# Patient Record
Sex: MALE | Race: ASIAN | URBAN - METROPOLITAN AREA
[De-identification: names, ages, dates, MRNs, and addresses within clinical notes are randomized per-mention and may not be internally consistent; named-entity substitution may affect disease eponyms.]

---

## 2017-10-20 ENCOUNTER — OFFICE VISIT (OUTPATIENT)
Dept: INTERNAL MEDICINE CLINIC | Age: 47
End: 2017-10-20

## 2017-10-20 VITALS
HEIGHT: 64 IN | RESPIRATION RATE: 18 BRPM | WEIGHT: 132.4 LBS | SYSTOLIC BLOOD PRESSURE: 120 MMHG | BODY MASS INDEX: 22.61 KG/M2 | TEMPERATURE: 97.4 F | HEART RATE: 78 BPM | OXYGEN SATURATION: 98 % | DIASTOLIC BLOOD PRESSURE: 76 MMHG

## 2017-10-20 DIAGNOSIS — Z00.00 ROUTINE GENERAL MEDICAL EXAMINATION AT A HEALTH CARE FACILITY: Primary | ICD-10-CM

## 2017-10-20 DIAGNOSIS — D17.9 LIPOMA, UNSPECIFIED SITE: ICD-10-CM

## 2017-10-20 DIAGNOSIS — Z23 ENCOUNTER FOR IMMUNIZATION: ICD-10-CM

## 2017-10-20 DIAGNOSIS — K51.919 ULCERATIVE COLITIS WITH COMPLICATION, UNSPECIFIED LOCATION (HCC): ICD-10-CM

## 2017-10-20 RX ORDER — INFLIXIMAB 100 MG/10ML
5 INJECTION, POWDER, LYOPHILIZED, FOR SOLUTION INTRAVENOUS ONCE
COMMUNITY
End: 2019-02-19

## 2017-10-20 RX ORDER — GLUCOSAMINE SULFATE 1500 MG
POWDER IN PACKET (EA) ORAL DAILY
COMMUNITY
End: 2019-03-13

## 2017-10-20 NOTE — PROGRESS NOTES
Add dyazie   Bp check 4 weeks   Chief Complaint   Patient presents with   [facility]    Pain (Chronic)     Pt states having pain in right elbow for 2 weeks         1. Have you been to the ER, urgent care clinic since your last visit? No Hospitalized since your last visit? No    2. Have you seen or consulted any other health care providers outside of the [address] [place] [address] since your last visit? No  Include any pap smears or colon screening. No     [place] Bounds who present for routine immunizations. He denies any symptoms , reactions or allergies that would exclude them from being immunized today. Risks and adverse reactions were discussed and the VIS was given to them. All questions were addressed. He was observed for 5 min post injection. There were no reactions observed.     Rosario Terrell

## 2017-10-20 NOTE — PROGRESS NOTES
Establish Care and Pain (Chronic) (Pt states having pain in right elbow for 2 weeks )       HPI:  Jocelyn Benites is a 49y.o. year old male who is here to establish care. He  had his medical care:    Danay Nicholson (wife)  PCP in [place]    He reports the following history and medical concerns:      Ulcerative Colitis- First Visit today- Dr. Deedee Helm. Still getting remicaide. Getting every 8 weeks. No symptoms. No complications over 10 years. Takes vit D. Last blood over a year ago. New Problem:     normally go to the gym. 2 weeks ago. [place]. Firey pain on left tendon        Assessment and Plan        1. Encounter for immunization  Immunization given. Discussed risks and benefits. Side effects. VIS given through visit summary via Mychart or paper copy if not on Mychart   - inFLIXimab (REMICADE) 100 mg injection; 5 mg/kg by IntraVENous route once. - cholecalciferol (VITAMIN D3) 1,000 unit cap; Take  by mouth daily.  - Influenza virus vaccine (QUADRIVALENT PRES FREE SYRINGE) IM (75942)  - IA IMMUNIZ ADMIN,1 SINGLE/COMB VAC/TOXOID    2. Routine general medical examination at a health care facility  Well exam.  Will see GI for follow up.    - inFLIXimab (REMICADE) 100 mg injection; 5 mg/kg by IntraVENous route once. - cholecalciferol (VITAMIN D3) 1,000 unit cap; Take  by mouth daily.  - Influenza virus vaccine (QUADRIVALENT PRES FREE SYRINGE) IM ([id number])  - IA IMMUNIZ ADMIN,1 SINGLE/COMB VAC/TOXOID  - LIPID PANEL  - TSH REFLEX TO T4  - METABOLIC PANEL, COMPREHENSIVE  - HEMOGLOBIN A1C WITH EAG  - VITAMIN D, 25 HYDROXY  - PSA SCREENING (SCREENING)  - CBC WITH AUTOMATED DIFF  - UA/M W/RFLX CULTURE, ROUTINE  - MICROALBUMIN, UR, RAND W/ MICROALBUMIN/CREA RATIO  - C REACTIVE PROTEIN, QT  - QUANTIFERON TB GOLD    3. Ulcerative colitis with complication, unspecified location [facility])  Pt will see Dr. Deedee Helm.   Labs ordered for CRP and quantiferon.  - inFLIXimab (REMICADE) 100 mg injection; 5 mg/kg by IntraVENous route once. - cholecalciferol (VITAMIN D3) 1,000 unit cap; Take  by mouth daily.  - Influenza virus vaccine (QUADRIVALENT PRES FREE SYRINGE) IM ([id number])  - MT IMMUNIZ ADMIN,1 SINGLE/COMB VAC/TOXOID  - LIPID PANEL  - TSH REFLEX TO T4  - METABOLIC PANEL, COMPREHENSIVE  - HEMOGLOBIN A1C WITH EAG  - VITAMIN D, 25 HYDROXY  - PSA SCREENING (SCREENING)  - CBC WITH AUTOMATED DIFF  - UA/M W/RFLX CULTURE, ROUTINE  - MICROALBUMIN, UR, RAND W/ MICROALBUMIN/CREA RATIO  - C REACTIVE PROTEIN, QT  - QUANTIFERON TB GOLD  - PSA SCREENING (SCREENING)    4. Lipoma, unspecified site  Lipoma like lesions, some increasing in size. Wants evaluation  - REFERRAL TO DERMATOLOGY    5. Golfer's elbow from weight lifting (doing fly's)  Rest, tiger balm, strengthening exercises. Visit Vitals    /76 (BP 1 Location: Left arm, BP Patient Position: Sitting)    Pulse 78    Temp 97.4 °F (36.3 °C) (Oral)    Resp 18    Ht 5' 4.4\" (1.636 m)    Wt 132 lb 6.4 oz (60.1 kg)    SpO2 98%    BMI 22.44 kg/m2       Historical Data    Past Medical History:   Diagnosis Date    Chronic pain     joint pain right elbow        Past Surgical History:   Procedure Laterality Date    HX TUMOR REMOVAL  2013    lipomas removed from different areas        Outpatient Encounter Prescriptions as of 10/20/2017   Medication Sig Dispense Refill    inFLIXimab (REMICADE) 100 mg injection 5 mg/kg by IntraVENous route once.  cholecalciferol (VITAMIN D3) 1,000 unit cap Take  by mouth daily. No facility-administered encounter medications on file as of 10/20/2017.          No Known Allergies     Social History     Social History    Marital status:      Spouse name: N/A    Number of children: N/A    Years of education: N/A     Occupational History    [employer] video      Social History Main Topics    Smoking status: Never Smoker    Smokeless tobacco: Never Used    Alcohol use Yes      Comment: 4 per week    Drug use: No    Sexual activity: Yes     Partners: Female     Other Topics Concern    Not on file     Social History Narrative    No narrative on file        family history includes Hypertension in his mother; Thyroid Disease in his father and sister. Review of Systems   Constitutional: Negative for weight loss. Eyes: Negative for blurred vision. Respiratory: Negative for shortness of breath. Cardiovascular: Negative for chest pain and leg swelling. Gastrointestinal: Negative for abdominal pain. Genitourinary: Negative for dysuria and frequency. Skin: Negative for rash. Neurological: Negative for dizziness, focal weakness, weakness and headaches. Endo/Heme/Allergies: Negative for environmental allergies. Does not bruise/bleed easily. Psychiatric/Behavioral: Negative for depression. Physical Exam   Constitutional: He is oriented to person, place, and time. He appears well-nourished. No distress. HENT:   Right Ear: External ear normal.   Left Ear: External ear normal.   Mouth/Throat: No oropharyngeal exudate. Neck: Carotid bruit is not present. No thyromegaly present. Cardiovascular: Normal rate, regular rhythm and normal heart sounds. Pulmonary/Chest: Effort normal and breath sounds normal. No respiratory distress. He has no wheezes. Abdominal: Soft. Bowel sounds are normal. He exhibits mass (skin lipoma like lesions present all over abdomen). There is no tenderness. Hernia confirmed negative in the right inguinal area and confirmed negative in the left inguinal area. Genitourinary: Penis normal. Right testis shows no mass, no swelling and no tenderness. Left testis shows no mass, no swelling and no tenderness. Musculoskeletal: He exhibits no edema or tenderness. Lymphadenopathy:     He has no cervical adenopathy. No inguinal adenopathy noted on the right or left side. Right: No inguinal adenopathy present. Left: No inguinal adenopathy present.    Neurological: He is alert and oriented to person, place, and time. Skin: Skin is warm and dry. No rash noted. No erythema. Psychiatric: He has a normal mood and affect. Thought content normal.   Nursing note and vitals reviewed. Ortho Exam       Orders Placed This Encounter    RI IMMUNIZ ADMIN,1 SINGLE/COMB VAC/TOXOID    QUANTIFERON TB GOLD    Influenza virus vaccine (QUADRIVALENT PRES FREE SYRINGE) IM ([id number])    LIPID PANEL    TSH REFLEX TO T4    METABOLIC PANEL, COMPREHENSIVE    HEMOGLOBIN A1C WITH EAG    VITAMIN D, 25 HYDROXY    PSA SCREENING (SCREENING)    CBC WITH AUTOMATED DIFF    UA/M W/RFLX CULTURE, ROUTINE    MICROALBUMIN, UR, RAND W/ MICROALBUMIN/CREA RATIO    C REACTIVE PROTEIN, QT    PSA SCREENING (SCREENING)    CBC WITH AUTOMATED DIFF     Standing Status:   Future     Standing Expiration Date:       METABOLIC PANEL, COMPREHENSIVE     Standing Status:   Future     Standing Expiration Date:   2018    LIPID PANEL     Standing Status:   Future     Standing Expiration Date:   2018    TSH 3RD GENERATION     Standing Status:   Future     Standing Expiration Date:   2018    VITAMIN D, 25 HYDROXY     Standing Status:   Future     Standing Expiration Date:   2018    PSA TOTAL (REFLEX TO FREE)     Standing Status:   Future     Standing Expiration Date:   2018    HEMOGLOBIN A1C WITH EAG     Standing Status:   Future     Standing Expiration Date:   2018    URINALYSIS W/ RFLX MICROSCOPIC     Standing Status:   Future     Standing Expiration Date:   2018    MICROALBUMIN, UR, RAND W/ MICROALBUMIN/CREA RATIO     Standing Status:   Future     Standing Expiration Date:   2018    REFERRAL TO DERMATOLOGY     Referral Priority:   Routine     Referral Type:   Consultation     Referral Reason:   Specialty Services Required     Referred to Provider:   Wilmer Nevarez MD    inFLIXimab (REMICADE) 100 mg injection     Si mg/kg by IntraVENous route once.  cholecalciferol (VITAMIN D3) 1,000 unit cap     Sig: Take  by mouth daily. I have reviewed the patient's medical history in detail and updated the computerized patient record. We had a prolonged discussion about these complex clinical issues and went over the various important aspects to consider. All questions were answered. Advised him to call back or return to office if symptoms do not improve, change in nature, or persist.    He was given an after visit summary or informed of [employer] which includes patient instructions, diagnoses, current medications, & vitals. He expressed understanding with the diagnosis and plan.

## 2017-10-20 NOTE — PATIENT INSTRUCTIONS
Vaccine Information Statement    Influenza (Flu) Vaccine (Inactivated or Recombinant): What you need to know    Many Vaccine Information Statements are available in Romanian and other languages. See www.immunize.org/vis  Hojas de Información Sobre Vacunas están disponibles en Español y en muchos otros idiomas. Visite www.immunize.org/vis    1. Why get vaccinated? Influenza (flu) is a contagious disease that spreads around the [place] every year, usually between October and May. Flu is caused by influenza viruses, and is spread mainly by coughing, sneezing, and close contact. Anyone can get flu. Flu strikes suddenly and can last several days. Symptoms vary by age, but can include:   fever/chills   sore throat   muscle aches   fatigue   cough   headache    runny or stuffy nose    Flu can also lead to pneumonia and blood infections, and cause diarrhea and seizures in children. If you have a medical condition, such as heart or lung disease, flu can make it worse. Flu is more dangerous for some people. Infants and young children, people 72years of age and older, pregnant women, and people with certain health conditions or a weakened immune system are at greatest risk. Each year thousands of people in the [facility] die from flu, and many more are hospitalized. Flu vaccine can:   keep you from getting flu,   make flu less severe if you do get it, and   keep you from spreading flu to your family and other people. 2. Inactivated and recombinant flu vaccines    A dose of flu vaccine is recommended every flu season. Children 6 months through 6years of age may need two doses during the same flu season. Everyone else needs only one dose each flu season.        Some inactivated flu vaccines contain a very small amount of a mercury-based preservative called thimerosal. Studies have not shown thimerosal in vaccines to be harmful, but flu vaccines that do not contain thimerosal are available. There is no live flu virus in flu shots. They cannot cause the flu. There are many flu viruses, and they are always changing. Each year a new flu vaccine is made to protect against three or four viruses that are likely to cause disease in the upcoming flu season. But even when the vaccine doesnt exactly match these viruses, it may still provide some protection    Flu vaccine cannot prevent:   flu that is caused by a virus not covered by the vaccine, or   illnesses that look like flu but are not. It takes about 2 weeks for protection to develop after vaccination, and protection lasts through the flu season. 3. Some people should not get this vaccine    Tell the person who is giving you the vaccine:     If you have any severe, life-threatening allergies. If you ever had a life-threatening allergic reaction after a dose of flu vaccine, or have a severe allergy to any part of this vaccine, you may be advised not to get vaccinated. Most, but not all, types of flu vaccine contain a small amount of egg protein.  If you ever had Guillain-Barré Syndrome (also called GBS). Some people with a history of GBS should not get this vaccine. This should be discussed with your doctor.  If you are not feeling well. It is usually okay to get flu vaccine when you have a mild illness, but you might be asked to come back when you feel better. 4. Risks of a vaccine reaction    With any medicine, including vaccines, there is a chance of reactions. These are usually mild and go away on their own, but serious reactions are also possible. Most people who get a flu shot do not have any problems with it.      Minor problems following a flu shot include:    soreness, redness, or swelling where the shot was given     hoarseness   sore, red or itchy eyes   cough   fever   aches   headache   itching   fatigue  If these problems occur, they usually begin soon after the shot and last 1 or 2 days. More serious problems following a flu shot can include the following:     There may be a small increased risk of Guillain-Barré Syndrome (GBS) after inactivated flu vaccine. This risk has been estimated at 1 or 2 additional cases per million people vaccinated. This is much lower than the risk of severe complications from flu, which can be prevented by flu vaccine.  Young children who get the flu shot along with pneumococcal vaccine (PCV13) and/or DTaP vaccine at the same time might be slightly more likely to have a seizure caused by fever. Ask your doctor for more information. Tell your doctor if a child who is getting flu vaccine has ever had a seizure. Problems that could happen after any injected vaccine:      People sometimes faint after a medical procedure, including vaccination. Sitting or lying down for about 15 minutes can help prevent fainting, and injuries caused by a fall. Tell your doctor if you feel dizzy, or have vision changes or ringing in the ears.  Some people get severe pain in the shoulder and have difficulty moving the arm where a shot was given. This happens very rarely.  Any medication can cause a severe allergic reaction. Such reactions from a vaccine are very rare, estimated at about 1 in a million doses, and would happen within a few minutes to a few hours after the vaccination. As with any medicine, there is a very remote chance of a vaccine causing a serious injury or death. The safety of vaccines is always being monitored. For more information, visit: www.cdc.gov/vaccinesafety/    5. What if there is a serious reaction? What should I look for?  Look for anything that concerns you, such as signs of a severe allergic reaction, very high fever, or unusual behavior.     Signs of a severe allergic reaction can include hives, swelling of the face and throat, difficulty breathing, a fast heartbeat, dizziness, and weakness  usually within a few minutes to a few hours after the vaccination. What should I do?  If you think it is a severe allergic reaction or other emergency that cant wait, call 9-1-1 and get the person to the nearest hospital. Otherwise, call your doctor.  Reactions should be reported to the Vaccine Adverse Event Reporting System (VAERS). Your doctor should file this report, or you can do it yourself through  the VAERS web site at www.vaers. [facility].gov, or by calling 2-692.621.6949. VAERS does not give medical advice. 6. The National Vaccine Injury Compensation Program    The [facility] Vaccine Injury Compensation Program (VICP) is a federal program that was created to compensate people who may have been injured by certain vaccines. Persons who believe they may have been injured by a vaccine can learn about the program and about filing a claim by calling 8-864.925.5961 or visiting the [employer] website at www.[facility].gov/vaccinecompensation. There is a time limit to file a claim for compensation. 7. How can I learn more?  Ask your healthcare provider. He or she can give you the vaccine package insert or suggest other sources of information.  Call your local or state health department.  Contact the Centers for Disease Control and Prevention (CDC):  - Call 9-280.793.4108 (1-800-CDC-INFO) or  - Visit CDCs website at www.cdc.gov/flu    Vaccine Information Statement   Inactivated Influenza Vaccine   8/7/2015  42 URoxann Christofer Byrne [id number]    Department of Health and Human Services  Centers for Disease Control and Prevention    Office Use Only           Golfer's Elbow: Exercises  Your Care Instructions  Here are some examples of typical rehabilitation exercises for your condition. Start each exercise slowly. Ease off the exercise if you start to have pain. Your doctor or physical therapist will tell you when you can start these exercises and which ones will work best for you.   How to do the exercises  Wrist extensor stretch    1. Extend your affected arm in front of you and make a fist with your palm facing down. 2. Bend your wrist so that your fist points toward the floor. 3. With your other hand, gently bend your wrist farther until you feel a mild to moderate stretch in your forearm. 4. Hold for at least 15 to 30 seconds. 5. Repeat 2 to 4 times. 6. Repeat steps 1 through 5 with your fingers pointing toward the floor. Forearm extensor stretch    1. Place your affected elbow down at your side, bent at about 90 degrees. Then make a fist with your palm facing down. 2. Keeping your wrist bent, slowly straighten your elbow so your arm is down at your side. Then twist your fist out so your palm is facing out to the side and you feel a stretch. 3. Hold for at least 15 to 30 seconds. 4. Repeat 2 to 4 times. Wrist flexor stretch    1. Extend your affected arm in front of you with your palm facing away from your body. 2. Bend back your wrist, pointing your hand up toward the ceiling. 3. With your other hand, gently bend your wrist farther until you feel a mild to moderate stretch in your forearm. 4. Hold for at least 15 to 30 seconds. 5. Repeat 2 to 4 times. 6. Repeat steps 1 through 5, but this time extend your affected arm in front of you with your palm facing up. Then bend back your wrist, pointing your hand toward the floor. Wrist curls    1. Place your forearm on a table with your hand hanging over the edge of the table, palm up. 2. Place a 1- to 2-pound weight in your hand. This may be a dumbbell, a can of food, or a filled water bottle. 3. Slowly raise and lower the weight while keeping your forearm on the table and your palm facing up. 4. Repeat this motion 8 to 12 times. 5. Switch arms, and do steps 1 through 4.  6. Repeat with your hand facing down toward the floor. Switch arms. Resisted wrist extension    1. Sit leaning forward with your legs slightly spread.  Then place your affected forearm on your thigh with your hand and wrist in front of your knee. 2. Grasp one end of an exercise band with your palm down, and step on the other end.  3. Slowly bend your wrist upward for a count of 2, then lower your wrist slowly to a count of 5.  4. Repeat 8 to 12 times. Resisted wrist flexion    1. Sit leaning forward with your legs slightly spread. Then place your affected forearm on your thigh with your hand and wrist in front of your knee. 2. Grasp one end of an exercise band with your palm up, and step on the other end.  3. Slowly bend your wrist upward for a count of 2, then lower your wrist slowly to a count of 5.  4. Repeat 8 to 12 times. Neck stretch to the side    1. This stretch works best if you keep your shoulder down as you lean away from it. To help you remember to do this, start by relaxing your shoulders and lightly holding on to your thighs or your chair. 2. Tilt your head away from your affected elbow and toward your opposite shoulder. For example, if your right elbow is sore, keep your right shoulder down as you lean your head toward your left shoulder. 3. Hold for 15 to 30 seconds. Let the weight of your head stretch your muscles. 4. If you would like a little added stretch, use your hand to gently and steadily pull your head toward your shoulder. For example, if your right elbow is sore, use your left hand to gently pull your head toward your left shoulder. 5. Repeat 2 to 4 times. Resisted forearm pronation    1. Sit leaning forward with your legs slightly spread. Then place your affected forearm on your thigh with your hand and wrist in front of your knee. 2. Grasp one end of an exercise band with your palm up, and step on the other end. 3. Keeping your wrist straight, roll your palm inward toward your thigh for a count of 2, then slowly move your wrist back to the starting position to a count of 5.  4. Repeat 8 to 12 times. Resisted supination    1.  Sit leaning forward with your legs slightly spread. Then place your affected forearm on your thigh with your hand and wrist in front of your knee. 2. Grasp one end of an exercise band with your palm down, and step on the other end. 3. Keeping your wrist straight, roll your palm outward and away from your thigh for a count of 2, then slowly move your wrist back to the starting position to a count of 5.  4. Repeat 8 to 12 times. Follow-up care is a key part of your treatment and safety. Be sure to make and go to all appointments, and call your doctor if you are having problems. It's also a good idea to know your test results and keep a list of the medicines you take. Where can you learn more? Go to http://bibi-toño.info/. Enter ([phone] 8518 5683 in the search box to learn more about \"Golfer's Elbow: Exercises. \"  Current as of: March 21, 2017  Content Version: 11.3  © 4109-6113 [employer]. Care instructions adapted under license by [employer] (which disclaims liability or warranty for this information). If you have questions about a medical condition or this instruction, always ask your healthcare professional. [address] any warranty or liability for your use of this information.

## 2017-10-24 LAB
25(OH)D3+25(OH)D2 SERPL-MCNC: 27.5 NG/ML (ref 30–100)
ALBUMIN SERPL-MCNC: 4 G/DL (ref 3.5–5.5)
ALBUMIN/CREAT UR: ABNORMAL MG/G CREAT (ref 0–30)
ALBUMIN/GLOB SERPL: 1.2 {RATIO} (ref 1.2–2.2)
ALP SERPL-CCNC: 69 IU/L (ref 39–117)
ALT SERPL-CCNC: 13 IU/L (ref 0–44)
APPEARANCE UR: CLEAR
AST SERPL-CCNC: 20 IU/L (ref 0–40)
BACTERIA #/AREA URNS HPF: NORMAL /[HPF]
BASOPHILS # BLD AUTO: 0.1 X10E3/UL (ref 0–0.2)
BASOPHILS NFR BLD AUTO: 1 %
BILIRUB SERPL-MCNC: 0.5 MG/DL (ref 0–1.2)
BILIRUB UR QL STRIP: NEGATIVE
BUN SERPL-MCNC: 17 MG/DL (ref 6–24)
BUN/CREAT SERPL: 14 (ref 9–20)
CALCIUM SERPL-MCNC: 9.3 MG/DL (ref 8.7–10.2)
CASTS URNS QL MICRO: NORMAL /LPF
CHLORIDE SERPL-SCNC: 102 MMOL/L (ref 96–106)
CHOLEST SERPL-MCNC: 228 MG/DL (ref 100–199)
CO2 SERPL-SCNC: 26 MMOL/L (ref 18–29)
COLOR UR: YELLOW
CREAT SERPL-MCNC: 1.21 MG/DL (ref 0.76–1.27)
CREAT UR-MCNC: 46.5 MG/DL
CRP SERPL-MCNC: 2.3 MG/L (ref 0–4.9)
EOSINOPHIL # BLD AUTO: 0.2 X10E3/UL (ref 0–0.4)
EOSINOPHIL NFR BLD AUTO: 3 %
EPI CELLS #/AREA URNS HPF: NORMAL /HPF
ERYTHROCYTE [DISTWIDTH] IN BLOOD BY AUTOMATED COUNT: 13.4 % (ref 12.3–15.4)
EST. AVERAGE GLUCOSE BLD GHB EST-MCNC: 120 MG/DL
GFR SERPLBLD CREATININE-BSD FMLA CKD-EPI: 70 ML/MIN/1.73
GFR SERPLBLD CREATININE-BSD FMLA CKD-EPI: 81 ML/MIN/1.73
GLOBULIN SER CALC-MCNC: 3.4 G/DL (ref 1.5–4.5)
GLUCOSE SERPL-MCNC: 87 MG/DL (ref 65–99)
GLUCOSE UR QL: NEGATIVE
HBA1C MFR BLD: 5.8 % (ref 4.8–5.6)
HCT VFR BLD AUTO: 42.5 % (ref 37.5–51)
HDLC SERPL-MCNC: 44 MG/DL
HGB BLD-MCNC: 13.7 G/DL (ref 12.6–17.7)
HGB UR QL STRIP: NEGATIVE
IMM GRANULOCYTES # BLD: 0 X10E3/UL (ref 0–0.1)
IMM GRANULOCYTES NFR BLD: 0 %
INTERPRETATION, 910389: NORMAL
KETONES UR QL STRIP: NEGATIVE
LDLC SERPL CALC-MCNC: 161 MG/DL (ref 0–99)
LEUKOCYTE ESTERASE UR QL STRIP: NEGATIVE
LYMPHOCYTES # BLD AUTO: 4.1 X10E3/UL (ref 0.7–3.1)
LYMPHOCYTES NFR BLD AUTO: 59 %
MCH RBC QN AUTO: 29.5 PG (ref 26.6–33)
MCHC RBC AUTO-ENTMCNC: 32.2 G/DL (ref 31.5–35.7)
MCV RBC AUTO: 92 FL (ref 79–97)
MICRO URNS: NORMAL
MICRO URNS: NORMAL
MICROALBUMIN UR-MCNC: <3 UG/ML
MONOCYTES # BLD AUTO: 0.5 X10E3/UL (ref 0.1–0.9)
MONOCYTES NFR BLD AUTO: 7 %
NEUTROPHILS # BLD AUTO: 2.1 X10E3/UL (ref 1.4–7)
NEUTROPHILS NFR BLD AUTO: 30 %
NITRITE UR QL STRIP: NEGATIVE
PH UR STRIP: 6 [PH] (ref 5–7.5)
PLATELET # BLD AUTO: 267 X10E3/UL (ref 150–379)
POTASSIUM SERPL-SCNC: 4.3 MMOL/L (ref 3.5–5.2)
PROT SERPL-MCNC: 7.4 G/DL (ref 6–8.5)
PROT UR QL STRIP: NEGATIVE
PSA SERPL-MCNC: 0.9 NG/ML (ref 0–4)
RBC # BLD AUTO: 4.64 X10E6/UL (ref 4.14–5.8)
RBC #/AREA URNS HPF: NORMAL /HPF
SODIUM SERPL-SCNC: 144 MMOL/L (ref 134–144)
SP GR UR: 1.01 (ref 1–1.03)
TRIGL SERPL-MCNC: 115 MG/DL (ref 0–149)
TSH SERPL DL<=0.005 MIU/L-ACNC: 2.54 UIU/ML (ref 0.45–4.5)
URINALYSIS REFLEX, 377202: NORMAL
UROBILINOGEN UR STRIP-MCNC: 0.2 MG/DL (ref 0.2–1)
VLDLC SERPL CALC-MCNC: 23 MG/DL (ref 5–40)
WBC # BLD AUTO: 7 X10E3/UL (ref 3.4–10.8)
WBC #/AREA URNS HPF: NORMAL /HPF

## 2017-10-25 NOTE — PROGRESS NOTES
Mr. Jasbir Brown,  Mostly good results but 3 things were abnormal and we should follow up for these in 6 months. 1. Your hga1c is 5.8 which is in the prediabetes range. Reducing carb intake in the evening is important. Did I give you my handout on the 3 column glycemic index? Increasing resistance type exercise helps build more insulin receptors increasing lean body mass. 2. Cholesterol was high at161 for the LDL. Reduce items like cheese and fried foods. Goal is at least under 130 with that. Omega 3 fish oil once  A day can help with inflammation and cholesterol. 3.  Low vit d. Start taking 1000 of vit D3 over the counter once a day. Let me know if you have questions as we should recheck these in 6 months.    Message sent to patient via [employer]:  October 25, 2017

## 2018-01-29 ENCOUNTER — OFFICE VISIT (OUTPATIENT)
Dept: DERMATOLOGY | Facility: AMBULATORY SURGERY CENTER | Age: 48
End: 2018-01-29

## 2018-01-29 VITALS
RESPIRATION RATE: 12 BRPM | SYSTOLIC BLOOD PRESSURE: 110 MMHG | BODY MASS INDEX: 23.05 KG/M2 | WEIGHT: 135 LBS | DIASTOLIC BLOOD PRESSURE: 80 MMHG | HEIGHT: 64 IN | OXYGEN SATURATION: 98 % | HEART RATE: 70 BPM

## 2018-01-29 DIAGNOSIS — D17.1 LIPOMA OF SKIN AND SUBCUTANEOUS TISSUE OF TRUNK: ICD-10-CM

## 2018-01-29 DIAGNOSIS — D22.9 MULTIPLE BENIGN NEVI: ICD-10-CM

## 2018-01-29 DIAGNOSIS — D18.01 CHERRY ANGIOMA: ICD-10-CM

## 2018-01-29 DIAGNOSIS — D17.20 LIPOMA OF UPPER EXTREMITY, UNSPECIFIED LATERALITY: Primary | ICD-10-CM

## 2018-01-29 DIAGNOSIS — L82.1 SEBORRHEIC KERATOSES: ICD-10-CM

## 2018-01-29 RX ORDER — MESALAMINE 1.2 G/1
2.4 TABLET, DELAYED RELEASE ORAL DAILY
COMMUNITY
Start: 2018-01-16

## 2018-01-29 NOTE — PROGRESS NOTES
Name: Ave De La Rosa       Age: 49 y.o. Date: 1/29/2018    Chief Complaint:   Chief Complaint   Patient presents with    Skin Exam     referred by Dr. Lis Madden:    HPI  Mr. Ave De La Rosa is a 49 y.o. male who presents as a new patient to [place] [address] for a skin exam.  The patient was referred for this evaluation by Dr. Autumn Galo. The patient has not had a skin exam in the past and the patient does have current complaints related to his skin. He reports multiple growths on his forearms, trunk. He states he has previously been diagnosed as lipomas and has had multiple removed. He continues to grow new ones despite the removals that he has had in the past.  These are tender due to the fact that the ER places that his arms bump. There are others that are growing and more large. He would like to entertain removal of the tender lesions. The patient's pertinent skin history includes: No personal history of skin cancer, history of lipoma removals and a family history of lipomas as well. ROS: Constitutional: Negative.     Dermatological : positive for - skin lesion changes      Social History     Social History    Marital status:      Spouse name: N/A    Number of children: N/A    Years of education: N/A     Occupational History    [employer]      Social History Main Topics    Smoking status: Never Smoker    Smokeless tobacco: Never Used    Alcohol use Yes      Comment: 4 per week    Drug use: No    Sexual activity: Yes     Partners: Female     Other Topics Concern    Not on file     Social History Narrative       Family History   Problem Relation Age of Onset    Hypertension Mother     Thyroid Disease Father      hyper    Thyroid Disease Sister      hyper       Past Medical History:   Diagnosis Date    Chronic pain     joint pain right elbow     Sun-damaged skin        Past Surgical History:   Procedure Laterality Date    HX TUMOR REMOVAL  2013 lipomas removed from different areas        Current Outpatient Prescriptions   Medication Sig Dispense Refill    mesalamine (LIALDA) 1.2 gram delayed release tablet 2.4 g.      FOLIC ACID/MULTIVIT,IRON, (CENTRUM PO) Take  by mouth.  inFLIXimab (REMICADE) 100 mg injection 5 mg/kg by IntraVENous route once.  cholecalciferol (VITAMIN D3) 1,000 unit cap Take  by mouth daily. No Known Allergies      Objective:    Visit Vitals    /80 (BP 1 Location: Left arm, BP Patient Position: Sitting)    Pulse 70    Resp 12    Ht 5' 4.4\" (1.636 m)    Wt 61.2 kg (135 lb)    SpO2 98%    BMI 22.89 kg/m2       Moise Dominguez is a 49 y.o. male who appears well and in no distress. He is awake, alert, and oriented. There is no preauricular, submandibular, or cervical lymphadenopathy. A skin examination was performed including his scalp, face (including eyelid), ears, neck, chest, back, abdomen, upper extremities (including digits/nails), lower extremities, breasts, buttocks; genital skin was not examined. He has Jacqualyn Nicely skin type V. His scattered cherry angiomas. There are scattered stuck on waxy macules and keratotic papules consistent with seborrheic keratosis. He has few nevi, most are darkly pigmented junctional, no concerning features for severe atypia. There are multiple rubbery subcutaneous nodules on the forearms, trunk, and thighs consistent with lipomas. There are multiple scars that are well-healed. Assessment/Plan:  1. Lipomas. Discussed diagnosis and ability to remove small, superficial lesions here in the office. He was seen by Dr. Kamran Mcadams as well who agrees to remove multiple on his arms, one arm at a time. Trunk based lipomas are a bit larger. I gave him suggestion of plastics if he desires removal.  2.Normal nevi. The diagnosis of normal nevi was reviewed.   I discussed sun protection, sunscreen use, the warning signs of skin cancer, the need for self-skin examinations, and the need for regular practitioner exams every few years or as deemed necessary by the patient or his PCP. The patient should follow up sooner as needed if new, changing, or symptomatic skin lesions arise. 3.Seborrheic keratoses. The diagnosis was reviewed and the patient was reassured that no treatment is needed for these benign lesions. 4.Cherry angiomas. The diagnosis was reviewed and the patient was reassured that no treatment is needed for these benign lesions.

## 2018-01-29 NOTE — PROGRESS NOTES
Chief Complaint   Patient presents with    Skin Exam     referred by Dr. Ochoa Setting     1. Have you been to the ER, urgent care clinic since your last visit? Hospitalized since your last visit? No    2. Have you seen or consulted any other health care providers outside of the [address] [place] [address] since your last visit? Include any pap smears or colon screening.  No

## 2018-01-29 NOTE — MR AVS SNAPSHOT
[address] [facility] [address] 
[phone] Patient: Trice Crespo MRN: [id number] Visit Information Date & Time Provider Department Dept. Phone Encounter #  
 1/29/2018  1:00 PM SHEREEN Streeter [id number] Upcoming Health Maintenance Date Due DTaP/Tdap/Td series (1 - Tdap) 9/13/1990 Allergies as of 1/29/2018  Review Complete On: 1/29/2018 By: Don Milligan No Known Allergies Current Immunizations  Never Reviewed Name Date Influenza Vaccine (Quad) PF 10/20/2017 Not reviewed this visit Vitals BP Pulse Resp Height(growth percentile) Weight(growth percentile) SpO2  
 110/80 (BP 1 Location: Left arm, BP Patient Position: Sitting) 70 12 5' 4.4\" (1.636 m) 135 lb (61.2 kg) 98% BMI Smoking Status 22.89 kg/m2 Never Smoker BMI and BSA Data Body Mass Index Body Surface Area  
 22.89 kg/m 2 1.67 m 2 Preferred Pharmacy Pharmacy Name Phone [facility] [address] [facility] [phone] Your Updated Medication List  
  
   
This list is accurate as of: 1/29/18  1:06 PM.  Always use your most recent med list.  
  
  
  
  
 CENTRUM PO Take  by mouth.  
  
 mesalamine 1.2 gram delayed release tablet Commonly known as:  LIALDA  
2.4 g.  
  
 REMICADE 100 mg injection Generic drug:  inFLIXimab  
5 mg/kg by IntraVENous route once. VITAMIN D3 1,000 unit Cap Generic drug:  cholecalciferol Take  by mouth daily. Patient Instructions Self Skin Exam and Sunscreens Early detection and treatment is essential in the treatment of all forms of skin cancer. If caught early, all forms of skin cancer are curable.   In addition to your regular visits, you should perform a monthly skin examination. Over time, you become familiar with what is normally found on your skin and can identify new or suspicious spots. One of the screening tools you can use to assess your skin is to follow the ABCDEs: 
 
A= Asymmetry (One half is unlike the other half) B= Border (An irregular, scalloped or poorly defined edge) C= Color (Is varied from one area to another, has shades of tan, brown/ black,       white, red or blue) D= Diameter (Spots larger than 6mm or a pencil eraser) E= Evolving (New spots or one that is changing in size, shape, or color) A follow- up interval will be customized based on your history of skin cancer or level of skin damage and risk factors. In any case, if you notice a suspicious or new spot, an appointment should be arranged between regular visits. Everyone should use sunscreen and sun-safe practices, which is especially important for those with a personal or family history of skin cancer. Suggestions for this include: 1. Use daily moisturizers containing SPF 30 or higher. 2. Wear long sleeve clothing with UPF ratings and a broad-brimmed hat. 3. Apply sunscreen with SPF 30 or higher to all sun exposed areas if you are going to be in the sun. A broad spectrum UVA/ UVB sunscreen is best.  Dont forget to REAPPLY every two hours or more often if swimming or sweating! 4. Avoid outside activities during peak sun hours, especially in the summer (10am- 2pm). 5. DO NOT use tanning beds. Using sunscreen and sun-safe practices can help reduce the likelihood of developing skin cancer or additional skin cancers in those previously diagnosed. Introducing [facility] & HEALTH SERVICES! Dear Renato Hughes: Thank you for requesting a [employer] account. Our records indicate that you already have an active [employer] account. You can access your account anytime at [email or web address] Did you know that you can access your hospital and ER discharge instructions at any time in [employer]? You can also review all of your test results from your hospital stay or ER visit. Additional Information If you have questions, please visit the Frequently Asked Questions section of the [employer] website at [email or web address] Remember, [employer] is NOT to be used for urgent needs. For medical emergencies, dial 911. Now available from your iPhone and Android! Please provide this summary of care documentation to your next provider. Your primary care clinician is listed as Veronica Oneal. If you have any questions after today's visit, please call [phone].

## 2018-02-28 ENCOUNTER — OFFICE VISIT (OUTPATIENT)
Dept: DERMATOLOGY | Facility: AMBULATORY SURGERY CENTER | Age: 48
End: 2018-02-28

## 2018-02-28 ENCOUNTER — HOSPITAL ENCOUNTER (OUTPATIENT)
Dept: LAB | Age: 48
Discharge: HOME | End: 2018-02-28

## 2018-02-28 VITALS
OXYGEN SATURATION: 99 % | TEMPERATURE: 98.2 F | SYSTOLIC BLOOD PRESSURE: 118 MMHG | HEART RATE: 70 BPM | DIASTOLIC BLOOD PRESSURE: 80 MMHG | WEIGHT: 135 LBS | HEIGHT: 64 IN | RESPIRATION RATE: 16 BRPM | BODY MASS INDEX: 23.05 KG/M2

## 2018-02-28 DIAGNOSIS — D17.9 MULTIPLE LIPOMAS: Primary | ICD-10-CM

## 2018-02-28 NOTE — MR AVS SNAPSHOT
[address] [facility] [address] [place] [address] [address] 
[phone] Patient: Karan Johns MRN: [id number] Visit Information Date & Time Provider Department Dept. Phone Encounter #  
 2/28/2018 10:00 AM MD Avelino Duran [id number] [phone] [id number] Your Appointments 3/13/2018  3:30 PM  
PROCEDURE with MD Avelino Duran 8057 [facility]: excision-lipoma removal forearm [facility] [address] [facility] [address]  
[address] [facility] [address] [place] [id number] Upcoming Health Maintenance Date Due DTaP/Tdap/Td series (1 - Tdap) 9/13/1990 Allergies as of 2/28/2018  Review Complete On: 2/28/2018 By: Edu Shanks LPN No Known Allergies Current Immunizations  Never Reviewed Name Date Influenza Vaccine (Quad) PF 10/20/2017 Not reviewed this visit You Were Diagnosed With   
  
 Codes Comments Multiple lipomas    -  Primary ICD-10-CM: D17.9 ICD-9-CM: 214.9 Vitals BP Pulse Temp Resp Height(growth percentile) Weight(growth percentile) 118/80 (BP 1 Location: Left arm, BP Patient Position: Sitting) 70 98.2 °F (36.8 °C) (Oral) 16 5' 4\" (1.626 m) 135 lb (61.2 kg) SpO2 BMI Smoking Status 99% 23.17 kg/m2 Never Smoker BMI and BSA Data Body Mass Index Body Surface Area  
 23.17 kg/m 2 1.66 m 2 Preferred Pharmacy Pharmacy Name Phone [facility] [address] [facility] [phone] Your Updated Medication List  
  
   
This list is accurate as of 2/28/18 10:52 AM.  Always use your most recent med list.  
  
  
  
  
 CENTRUM PO Take  by mouth.  
  
 mesalamine 1.2 gram delayed release tablet Commonly known as:  LIALDA  
2.4 g.  
  
 REMICADE 100 mg injection Generic drug:  inFLIXimab  
5 mg/kg by IntraVENous route once. VITAMIN D3 1,000 unit Cap Generic drug:  cholecalciferol Take  by mouth daily. Patient Instructions WOUND CARE INSTRUCTIONS 1. Keep the dressing clean and dry and do not remove for 48 hours. 2. Then change the dressing once a day as follows: 
a. Wash hands before and after each dressing change. b. Remove dressing and wash site gently with mild soap and water, rinse, and pat dry. 
c. Apply an ointment (Bacitracin, Polysporin, Neosporin, Petroleum jelly or Aquaphor). d. Apply a non-stick (Telfa) dressing or Band-Aid to cover the wound. Remove pressure bandage on Friday. You may shower daily at this point, no bandage necessary. Glue will eventually come off within the next 2 weeks. If you still feel rough glue at this point, you may apply vaseline to site daily until fully removed. 3. Watch for: BLEEDING: A small amount of drainage may occur. If bleeding occurs, elevate and rest the surgery site. Apply gauze and steady pressure for 15 minutes. If bleeding continues, call this office. INFECTION: Signs of infection include increased redness, pain, warmth, drainage of pus, and fever. If this occurs, call this office. 4. Special Instructions (follow any that are checked): 
· [x] You have stitches that DO NOT need to be removed. · [x] Avoid bending at the waist and heavy lifting for two days. · [] Sleep with your head elevated for the next two nights. · [x] Rest the surgery site and keep it elevated as much as possible for two days. · [] You may apply an ice-pack for 10-15 minutes every waking hour for the rest of the day. · [] Eat a soft diet and avoid hot food and hot drinks for the rest of the day. · [] Other instructions: Follow up as directed. Take Tylenol or Ibuprofen for pain as needed.  
 
 
Once the site is healed with no remaining bandages or open areas, protect your surgical site and scar from the sun, as this area will be more sensitive. Use a broad spectrum sunscreen SPF 30 or higher daily, and a chemical free product (one containing zinc oxide or titanium dioxide) is a good choice if the area is sensitive. You may begin to gently massage the surgical site in 2-3 weeks, rubbing in a circular motion along the scar. This can help reduce swelling and thickness of a scar. A scar cream may be used beginnning 1 month after the surgery. If you have any questions or concerns, please call our office Monday through Friday at [phone]. Introducing \[facility]\"" & [facility] SERVICES! Dear Mandy De La Rosa: Thank you for requesting a [employer] account. Our records indicate that you already have an active [employer] account. You can access your account anytime at [email or web address] Did you know that you can access your hospital and ER discharge instructions at any time in [employer])? You can also review all of your test results from your hospital stay or ER visit. Additional Information If you have questions, please visit the Frequently Asked Questions section of the [employer] website at [email or web address] Remember, [employer] is NOT to be used for urgent needs. For medical emergencies, dial 911. Now available from your iPhone and Android! Please provide this summary of care documentation to your next provider. Your primary care clinician is listed as Ailyn Kwan. If you have any questions after today's visit, please call [phone].

## 2018-02-28 NOTE — PATIENT INSTRUCTIONS
WOUND CARE INSTRUCTIONS    1. Keep the dressing clean and dry and do not remove for 48 hours. 2. Then change the dressing once a day as follows:  a. Wash hands before and after each dressing change. b. Remove dressing and wash site gently with mild soap and water, rinse, and pat dry.  c. Apply an ointment (Bacitracin, Polysporin, Neosporin, Petroleum jelly or Aquaphor). d. Apply a non-stick (Telfa) dressing or Band-Aid to cover the wound. Remove pressure bandage on Friday. You may shower daily at this point, no bandage necessary. Glue will eventually come off within the next 2 weeks. If you still feel rough glue at this point, you may apply vaseline to site daily until fully removed. 3. Watch for:  BLEEDING: A small amount of drainage may occur. If bleeding occurs, elevate and rest the surgery site. Apply gauze and steady pressure for 15 minutes. If bleeding continues, call this office. INFECTION: Signs of infection include increased redness, pain, warmth, drainage of pus, and fever. If this occurs, call this office. 4. Special Instructions (follow any that are checked):  · [x] You have stitches that DO NOT need to be removed. · [x] Avoid bending at the waist and heavy lifting for two days. · [] Sleep with your head elevated for the next two nights. · [x] Rest the surgery site and keep it elevated as much as possible for two days. · [] You may apply an ice-pack for 10-15 minutes every waking hour for the rest of the day. · [] Eat a soft diet and avoid hot food and hot drinks for the rest of the day. · [] Other instructions: Follow up as directed. Take Tylenol or Ibuprofen for pain as needed. Once the site is healed with no remaining bandages or open areas, protect your surgical site and scar from the sun, as this area will be more sensitive.   Use a broad spectrum sunscreen SPF 30 or higher daily, and a chemical free product (one containing zinc oxide or titanium dioxide) is a good choice if the area is sensitive. You may begin to gently massage the surgical site in 2-3 weeks, rubbing in a circular motion along the scar. This can help reduce swelling and thickness of a scar. A scar cream may be used beginnning 1 month after the surgery. If you have any questions or concerns, please call our office Monday through Friday at [phone].

## 2018-02-28 NOTE — PROGRESS NOTES
Written by Shanice Corbin, as dictated by Dr. Aileen Cancino. Jeramie Rodas MD.    CC: Lipomas on the left forearm    History of Present Illness:    Siddharth Daniels is a 49 y.o. male referred by Kirill Joyner DNP. He has four lipomas on the left forearm. These are new lipomas present for a long time described as tender lesions with no prior treatment. These are clinically evident lipomas. He states he has had lipomas treated in the past and has new ones appearing. He also reports lipomas on his trunk and thighs. These will be addressed in the future. He has no pain, no current illnesses, no other skin concerns. His allergies, medications, medical, and social history are reviewed by me today. Exam:    He is an awake, alert, and oriented 49 y.o. male who appears well and in no distress.  I examined his left forearm. He has an 8 x 8 mm soft mobile subcutaneous nodule on his left forearm #1, a 12 x 10 mm soft mobile subcutaneous nodule on his left forearm #2, an 18 x 12 mm soft mobile subcutaneous nodule on his left forearm #3, and a 21 x 15 mm soft mobile subcutaneous nodule on his left forearm #4. He confirms all of the locations. Assessment/Plan:     1. Lipoma, left forearm #1. Excision was advised for removal and therapy of this 8 x 8 mm lesion on the left forearm #1.  The excision procedure was reviewed and verbal and written consents were obtained.  The risks of pain, bleeding, infection, recurrence of the lesion, and scar were discussed.  I performed the procedure.  The site was cleansed and anesthetized with 1% lidocaine with epinephrine 1:100,000. An incision was made on top of the lesion to a depth of subcutaneous tissue and the lipoma was easily removed with manual pressure. A simple repair was performed after the excision. 4-0 polysorb suture was used for approximation of deep tissues and a second layer of 4-0 polysorb suture was used to approximate the skin edges.  The closure length was 6 mm.  The wound was bandaged and care reviewed.  The specimen was sent to pathology. I will contact the patient with the results and any further treatment that may be necessary. 2. Lipoma, left forearm #2. Excision was advised for removal and therapy of this 12 x 10 mm lesion on the left forearm #2.  The excision procedure was reviewed and verbal and written consents were obtained.  The risks of pain, bleeding, infection, recurrence of the lesion, and scar were discussed.  I performed the procedure.  The site was cleansed and anesthetized with 1% lidocaine with epinephrine 1:100,000.  An incision was made on top of the lesion to a depth of subcutaneous tissue and the lipoma was  easily removed with manual pressure.  A simple repair was performed after the excision. 4-0 polysorb suture was used for approximation of deep tissues and a second layer of 4-0 polysorb suture was used to approximate the skin edges. The closure length was 1.2 cm.  The wound was bandaged and care reviewed.  The specimen was sent to pathology. I will contact the patient with the results and any further treatment that may be necessary. 3. Lipoma, left forearm #3. Excision was advised for removal and therapy of this 18 x 12 mm lesion on the left forearm #3.  The excision procedure was reviewed and verbal and written consents were obtained.  The risks of pain, bleeding, infection, recurrence of the lesion, and scar were discussed.  I performed the procedure.  The site was cleansed and anesthetized with 1% lidocaine with epinephrine 1:100,000. An incision was made on top of the lesion to a depth of subcutaneous tissue and the lipoma was easily removed with manual pressure. A simple repair was performed after the excision. 4-0 polysorb suture was used for approximation of deep tissues and a second layer of 4-0 polysorb suture was used to approximate the skin edges.  The closure length was 1.2 cm.  The wound was bandaged and care reviewed.  The specimen was sent to pathology. I will contact the patient with the results and any further treatment that may be necessary. 4. Lipoma, left forearm #4. Excision was advised for removal and therapy of this 21 x 15 mm lesion on the left forearm #4.  The excision procedure was reviewed and verbal and written consents were obtained.  The risks of pain, bleeding, infection, recurrence of the lesion, and scar were discussed.  I performed the procedure.  The site was cleansed and anesthetized with 1% lidocaine with epinephrine 1:100,000. An incision was made on top of the lesion to a depth of subcutaneous tissue and the lipoma was easily removed with manual pressure. A simple repair was performed after the excision. 4-0 polysorb suture was used to approximate the skin edges. The closure length was 1.2 cm.  The wound was bandaged and care reviewed.  The specimen was sent to pathology. I will contact the patient with the results and any further treatment that may be necessary. The documentation recorded by the scribe accurately reflects the service I personally performed and the decisions made by me. [facility]   OFFICE PROCEDURE PROGRESS NOTE     Chart reviewed for the following:     Sonia Goodman MD, have reviewed the History, Physical and updated the Allergic reactions for [facility]    TIME OUT performed immediately prior to start of procedure:     Sonia Downey. Fredo Goodman MD, have performed the following reviews on [facility] prior to the start of the procedure:     * Patient was identified by name and date of birth   * Agreement on procedure being performed was verified   * Risks and Benefits explained to the patient   * Procedure site verified and marked as necessary   * Patient was positioned for comfort   * Consent was signed and verified     Time: 10:30 AM   Date of procedure: 2/28/2018  Procedure performed by: Christina Tipton.  Fredo Goodman MD Provider assisted by: LPN   Patient assisted by: self   How tolerated by patient: tolerated the procedure well with no complications   Comments: none

## 2018-03-12 ENCOUNTER — OFFICE VISIT (OUTPATIENT)
Dept: DERMATOLOGY | Facility: AMBULATORY SURGERY CENTER | Age: 48
End: 2018-03-12

## 2018-03-12 VITALS
HEART RATE: 74 BPM | DIASTOLIC BLOOD PRESSURE: 60 MMHG | OXYGEN SATURATION: 96 % | TEMPERATURE: 98.2 F | RESPIRATION RATE: 16 BRPM | SYSTOLIC BLOOD PRESSURE: 118 MMHG

## 2018-03-12 DIAGNOSIS — L23.1 ALLERGIC CONTACT DERMATITIS DUE TO ADHESIVES: Primary | ICD-10-CM

## 2018-03-12 NOTE — PROGRESS NOTES
This note was written by Pallavi Gtz, as dictated by Sid Alvarado MD.     Chief complaint: Wound check on the left forearm     HPI: Joshua Vickers presents for wound check following excisions performed on 2/28/18. I excised four lipomas on his left forearm. Skin glue was applied to the wounds. He reports itching, draining, blistering, and hives on the surgical sites. Exam: The surgical sites were examined. He has redness and edema with small vesicles consistent with contact dermatitis from the skin glue on his left forearm. Assessment/Plan:      1. Contact dermatitis, left forearm. The diagnosis related to the use of skin glue was discussed. His left forearm was bandaged. I will not use skin glue when I treat his remaining lipomas tomorrow. The documentation recorded by the scribe accurately reflects the service I personally performed and the decisions made by me.

## 2018-03-13 ENCOUNTER — HOSPITAL ENCOUNTER (OUTPATIENT)
Dept: LAB | Age: 48
Discharge: HOME | End: 2018-03-13

## 2018-03-13 ENCOUNTER — OFFICE VISIT (OUTPATIENT)
Dept: DERMATOLOGY | Facility: AMBULATORY SURGERY CENTER | Age: 48
End: 2018-03-13

## 2018-03-13 DIAGNOSIS — D17.9 MULTIPLE LIPOMAS: Primary | ICD-10-CM

## 2018-03-13 NOTE — PROGRESS NOTES
Written by Crystal Hightower, as dictated by Dr. Taina Palmer. Noy Nogueira MD.    CC: Lipomas on the right forearm     History of Present Illness:    Deirdre Quintero is a 49 y.o. male referred by Marc Mcbride DNP. He has four lipomas on the right forearm that he would like removed. These are new lipomas present for a long time described as tender lesions with no prior treatment. These are clinically evident lipomas. I treated four lipomas with excision on his left forearm on 2/28/18 and the specimens were sent to pathology. Pathology showed angiolipomas. He presented to 3/12/18 to have the healing of the sites on his left forearm evaluated. I determined he had contact dermatitis due to the use of skin glue. We will not use skin glue today. He is feeling well and in his usual state of health today. He has no pain, no current illnesses, no other skin concerns. His allergies, medications, medical, and social history are reviewed by me today. Exam:    He is an awake, alert, and oriented 49 y.o. male who appears well and in no distress.  I examined his right forearm. He has a 20 x 10 mm soft mobile subcutaneous nodule on his right forearm #1. He has a 10 x 9 mm soft mobile subcutaneous nodule on his right forearm #2. He has a 6 x 6 mm soft mobile subcutaneous nodule on his right forearm #3. He has a 19 x 11 mm soft mobile subcutaneous nodule on his right forearm #4. He confirms the locations. Assessment/Plan:    1. Lipoma, right forearm #1. Excision was advised for removal and therapy of this 20 x 10 mm lesion on the right forearm #1.  The excision procedure was reviewed and verbal and written consents were obtained.  The risks of pain, bleeding, infection, recurrence of the lesion, and scar were discussed.  I performed the procedure.  The site was cleansed and anesthetized with 1% lidocaine with epinephrine 1:100,000.  An incision was made on top of the lesion to a depth of subcutaneous tissue and the lipoma was easily removed with manual pressure. A simple repair was performed after the excision. 5-0 polysorb suture was used for approximation of deep tissues and the skin edges. The closure length was 12 mm.  The wound was bandaged and care reviewed.  The specimen was sent to pathology. I will contact the patient with the results and any further treatment that may be necessary. 2. Lipoma, right forearm #2. Excision was advised for removal and therapy of this 10 x 9 mm lesion on the right forearm #2.  The excision procedure was reviewed and verbal and written consents were obtained.  The risks of pain, bleeding, infection, recurrence of the lesion, and scar were discussed.  I performed the procedure.  The site was cleansed and anesthetized with 1% lidocaine with epinephrine 1:100,000. An incision was made on top of the lesion to a depth of subcutaneous tissue and the lipoma was easily removed with manual pressure. A simple repair was performed after the excision. 5-0 polysorb suture was used for approximation of deep tissues and the skin edges. The closure length was 8 mm.  The wound was bandaged and care reviewed.  The specimen was sent to pathology. I will contact the patient with the results and any further treatment that may be necessary. 3. Lipoma, right forearm #3. Excision was advised for removal and therapy of this 6 x 6 mm lesion on the right forearm #3.  The excision procedure was reviewed and verbal and written consents were obtained.  The risks of pain, bleeding, infection, recurrence of the lesion, and scar were discussed.  I performed the procedure.  The site was cleansed and anesthetized with 1% lidocaine with epinephrine 1:100,000.  An incision was made on top of the lesion to a depth of subcutaneous tissue and the lipoma was not easily removed with manual pressure; this was reviewed and suspecting that the lipoma was deeply situated I advised leaving it in place rather than extending the incision to attempt to find it. A simple repair was performed after the excision. 5-0 polysorb suture was used for approximation of deep tissues and the skin edges. The closure length was 5 mm.  The wound was bandaged and care reviewed.  We will proceed with treatment in the future if desired by the patient. 4. Lipoma, right forearm #4. Excision was advised for removal and therapy of this 19 x 11 mm lesion on the right forearm #4.  The excision procedure was reviewed and verbal and written consents were obtained.  The risks of pain, bleeding, infection, recurrence of the lesion, and scar were discussed.  I performed the procedure.  The site was cleansed and anesthetized with 1% lidocaine with epinephrine 1:100,000. An incision was made on top of the lesion to a depth of subcutaneous tissue and the lipoma was easily removed with manual pressure. A simple repair was performed after the excision. 5-0 polysorb suture was used for approximation of deep tissues and the skin edges. The closure length was 12 mm.  The wound was bandaged and care reviewed.  The specimen was sent to pathology. I will contact the patient with the results and any further treatment that may be necessary. The documentation recorded by the scribe accurately reflects the service I personally performed and the decisions made by me. [facility]   OFFICE PROCEDURE PROGRESS NOTE     Chart reviewed for the following:     Tessie Moore MD, have reviewed the History, Physical and updated the Allergic reactions for [facility]    TIME OUT performed immediately prior to start of procedure:     Tessie Mott.  Lisa Moore MD, have performed the following reviews on [facility] prior to the start of the procedure:     * Patient was identified by name and date of birth   * Agreement on procedure being performed was verified   * Risks and Benefits explained to the patient   * Procedure site verified and marked as necessary   * Patient was positioned for comfort   * Consent was signed and verified     Time: 3:31 PM   Date of procedure: 3/13/2018  Procedure performed by: Cherie Hdez.  Risa Gautam MD   Provider assisted by: LPN   Patient assisted by: self   How tolerated by patient: tolerated the procedure well with no complications   Comments: none

## 2019-02-18 ENCOUNTER — OFFICE VISIT (OUTPATIENT)
Dept: INTERNAL MEDICINE CLINIC | Age: 49
End: 2019-02-18

## 2019-02-18 VITALS
HEIGHT: 64 IN | HEART RATE: 74 BPM | DIASTOLIC BLOOD PRESSURE: 90 MMHG | WEIGHT: 141 LBS | RESPIRATION RATE: 16 BRPM | TEMPERATURE: 98.4 F | BODY MASS INDEX: 24.07 KG/M2 | OXYGEN SATURATION: 98 % | SYSTOLIC BLOOD PRESSURE: 130 MMHG

## 2019-02-18 DIAGNOSIS — Z00.00 ROUTINE GENERAL MEDICAL EXAMINATION AT A HEALTH CARE FACILITY: Primary | ICD-10-CM

## 2019-02-18 DIAGNOSIS — H93.8X3 CONGESTION OF BOTH EARS: ICD-10-CM

## 2019-02-18 DIAGNOSIS — K51.919 ULCERATIVE COLITIS WITH COMPLICATION, UNSPECIFIED LOCATION (HCC): ICD-10-CM

## 2019-02-18 NOTE — PROGRESS NOTES
Ear Pain (inner ear (right))       HPI:  Sophie Lang is a 51y.o. year old male who is here for a follow up visit. He was last seen by me on Visit date not found. He reports the following:    Stopped remicaide now. Doing well. For UC    Going on for a month. Thought it was a mild \"flu\"  With congestion and cough. Pressure in right ear. All other sx gone. Weird sensation right ear when he hiccups and burps. No difficulty swallowing. Yesterday sharp pain    Aware his a1c was high before and working on carbs. Assessment and Plan        1. Routine general medical examination at a health care facility  Well exam.  Off remicaide and doing well so far. Needs a1c rechecked. - CBC WITH AUTOMATED DIFF  - LIPID PANEL  - TSH REFLEX TO T4  - METABOLIC PANEL, COMPREHENSIVE  - UA/M W/RFLX CULTURE, ROUTINE  - MICROALBUMIN, UR, RAND W/ MICROALB/CREAT RATIO  - HEMOGLOBIN A1C WITH EAG    2. Ulcerative colitis with complication, [facility]  Stable. Gets regular colonoscopies. No systemic sx  - C REACTIVE PROTEIN, QT    3. Congestion of both ears  Try steam and saline wash. Right ear congested. Call if not better. No infection seen. Visit Vitals  /90 (BP 1 Location: Left arm, BP Patient Position: Sitting)   Pulse 74   Temp 98.4 °F (36.9 °C) (Oral)   Resp 16   Ht 5' 4\" (1.626 m)   Wt 141 lb (64 kg)   SpO2 98%   BMI 24.20 kg/m²       Historical Data    Past Medical History:   Diagnosis Date    Chronic pain     joint pain right elbow     Sun-damaged skin        Past Surgical History:   Procedure Laterality Date    HX TUMOR REMOVAL  2013    lipomas removed from different areas        Outpatient Encounter Medications as of 2/18/2019   Medication Sig Dispense Refill    mesalamine (LIALDA) 1.2 gram delayed release tablet 2.4 g.      FOLIC ACID/MULTIVIT,IRON, (CENTRUM PO) Take  by mouth.  cholecalciferol (VITAMIN D3) 1,000 unit cap Take  by mouth daily.       [DISCONTINUED] inFLIXimab (REMICADE) 100 mg injection 5 mg/kg by IntraVENous route once. No facility-administered encounter medications on file as of 2/18/2019. Allergies   Allergen Reactions    Other Medication Rash and Itching     Skin rash with itching after application of skin glue on surgical site        Social History     Socioeconomic History    Marital status:      Spouse name: Not on file    Number of children: Not on file    Years of education: Not on file    Highest education level: Not on file   Social Needs    Financial resource strain: Not on file    Food insecurity - worry: Not on file    Food insecurity - inability: Not on file    Transportation needs - medical: Not on file   QPID Health needs - non-medical: Not on file   Occupational History    Occupation: [employer]   Tobacco Use    Smoking status: Never Smoker    Smokeless tobacco: Never Used   Substance and Sexual Activity    Alcohol use: Yes     Comment: 4 per week    Drug use: No    Sexual activity: Yes     Partners: Female   Other Topics Concern    Not on file   Social History Narrative    Not on file        family history includes Hypertension in his mother; Thyroid Disease in his father and sister. Review of Systems   Constitutional: Negative for chills, diaphoresis, fever, malaise/fatigue and weight loss. HENT: Negative for hearing loss. Respiratory: Negative for cough. Cardiovascular: Negative for chest pain. Gastrointestinal: Negative for blood in stool and constipation. Genitourinary: Negative for dysuria, flank pain, frequency and urgency. Musculoskeletal: Negative for myalgias. Skin: Negative for rash. Neurological: Negative for dizziness, weakness and headaches. Endo/Heme/Allergies: Does not bruise/bleed easily. Physical Exam   Constitutional: He is oriented to person, place, and time. He appears well-nourished. No distress.    HENT:   Mouth/Throat: Oropharyngeal exudate present. Right and left ear congested. Neck: Carotid bruit is not present. No thyromegaly present. Cardiovascular: Normal rate, regular rhythm and normal heart sounds. Pulmonary/Chest: Effort normal and breath sounds normal. No respiratory distress. He has no wheezes. Abdominal: Soft. Bowel sounds are normal. He exhibits no mass. There is no tenderness. Musculoskeletal: He exhibits no edema or tenderness. Lymphadenopathy:     He has no cervical adenopathy. Neurological: He is alert and oriented to person, place, and time. Skin: Skin is warm and dry. No rash noted. No erythema. Psychiatric: He has a normal mood and affect. Thought content normal.   Nursing note and vitals reviewed. Ortho Exam      Orders Placed This Encounter    CBC WITH AUTOMATED DIFF    LIPID PANEL    TSH REFLEX TO T4    METABOLIC PANEL, COMPREHENSIVE    UA/M W/RFLX CULTURE, ROUTINE    MICROALBUMIN, UR, RAND W/ MICROALB/CREAT RATIO    HEMOGLOBIN A1C WITH EAG    C REACTIVE PROTEIN, QT        I have reviewed the patient's medical history in detail and updated the computerized patient record. We had a prolonged discussion about these complex clinical issues and went over the various important aspects to consider. All questions were answered. Advised him to call back or return to office if symptoms do not improve, change in nature, or persist.    He was given an after visit summary or informed of [facility] which includes patient instructions, diagnoses, current medications, & vitals. He expressed understanding with the diagnosis and plan. Call if not better.

## 2019-02-18 NOTE — PROGRESS NOTES
Chief Complaint Patient presents with  Ear Pain  
  inner ear (right) Reviewed record in preparation for visit and have obtained necessary documentation. Identified pt with two pt identifiers(name and ). Health Maintenance Due Topic  DTaP/Tdap/Td series (1 - Tdap) Chief Complaint Patient presents with  Ear Pain  
  inner ear (right) Wt Readings from Last 3 Encounters:  
19 141 lb (64 kg) 18 135 lb (61.2 kg) 18 135 lb (61.2 kg) Temp Readings from Last 3 Encounters:  
19 98.4 °F (36.9 °C) (Oral) 18 98.2 °F (36.8 °C)  
18 98.2 °F (36.8 °C) (Oral) BP Readings from Last 3 Encounters:  
19 130/90  
18 118/60  
18 118/80 Pulse Readings from Last 3 Encounters:  
19 74  
18 74  
18 70 Learning Assessment: 
:  
 
Learning Assessment 2019 10/20/2017 PRIMARY LEARNER Patient Patient HIGHEST LEVEL OF EDUCATION - PRIMARY LEARNER  4 YEARS OF COLLEGE 4 YEARS OF COLLEGE  
BARRIERS PRIMARY LEARNER - NONE PRIMARY LANGUAGE ENGLISH ENGLISH  
LEARNER PREFERENCE PRIMARY READING READING  
ANSWERED BY patient patient RELATIONSHIP SELF SELF Depression Screening: 
:  
 
3 most recent PHQ Screens 2019 Little interest or pleasure in doing things Not at all Feeling down, depressed, irritable, or hopeless Not at all Total Score PHQ 2 0 Fall Risk Assessment: 
:  
 
No flowsheet data found. Abuse Screening: 
:  
 
Abuse Screening Questionnaire 2019 10/20/2017 Do you ever feel afraid of your partner? Jenn Frame Are you in a relationship with someone who physically or mentally threatens you? Jenn Frame Is it safe for you to go home? Melody Latif Coordination of Care Questionnaire: 
:  
 
1) Have you been to an emergency room, urgent care clinic since your last visit? no  
Hospitalized since your last visit? no          
 
 2) Have you seen or consulted any other health care providers outside of [facility] since your last visit? no  (Include any pap smears or colon screenings in this section.) 3) Do you have an Advance Directive on file? no 
 
4) Are you interested in receiving information on Advance Directives? NO Patient is accompanied by self I have received verbal consent from Guru Mckeon to discuss any/all medical information while they are present in the room. Reviewed record  In preparation for visit and have obtained necessary documentation.

## 2019-02-19 PROBLEM — K51.919 ULCERATIVE COLITIS WITH COMPLICATION (HCC): Status: ACTIVE | Noted: 2019-02-19

## 2019-03-06 LAB
ALBUMIN SERPL-MCNC: 4.2 G/DL (ref 3.5–5.5)
ALBUMIN/CREAT UR: <3.3 MG/G CREAT (ref 0–30)
ALBUMIN/GLOB SERPL: 1.2 {RATIO} (ref 1.2–2.2)
ALP SERPL-CCNC: 82 IU/L (ref 39–117)
ALT SERPL-CCNC: 15 IU/L (ref 0–44)
APPEARANCE UR: CLEAR
AST SERPL-CCNC: 19 IU/L (ref 0–40)
BACTERIA #/AREA URNS HPF: NORMAL /[HPF]
BASOPHILS # BLD AUTO: 0 X10E3/UL (ref 0–0.2)
BASOPHILS NFR BLD AUTO: 0 %
BILIRUB SERPL-MCNC: 0.3 MG/DL (ref 0–1.2)
BILIRUB UR QL STRIP: NEGATIVE
BUN SERPL-MCNC: 15 MG/DL (ref 6–24)
BUN/CREAT SERPL: 12 (ref 9–20)
CALCIUM SERPL-MCNC: 9.2 MG/DL (ref 8.7–10.2)
CASTS URNS QL MICRO: NORMAL /LPF
CHLORIDE SERPL-SCNC: 99 MMOL/L (ref 96–106)
CHOLEST SERPL-MCNC: 195 MG/DL (ref 100–199)
CO2 SERPL-SCNC: 24 MMOL/L (ref 20–29)
COLOR UR: YELLOW
CREAT SERPL-MCNC: 1.28 MG/DL (ref 0.76–1.27)
CREAT UR-MCNC: 90.4 MG/DL
CRP SERPL-MCNC: 24 MG/L (ref 0–4.9)
EOSINOPHIL # BLD AUTO: 0.2 X10E3/UL (ref 0–0.4)
EOSINOPHIL NFR BLD AUTO: 2 %
EPI CELLS #/AREA URNS HPF: NORMAL /HPF
ERYTHROCYTE [DISTWIDTH] IN BLOOD BY AUTOMATED COUNT: 13.7 % (ref 12.3–15.4)
EST. AVERAGE GLUCOSE BLD GHB EST-MCNC: 120 MG/DL
GLOBULIN SER CALC-MCNC: 3.5 G/DL (ref 1.5–4.5)
GLUCOSE SERPL-MCNC: 79 MG/DL (ref 65–99)
GLUCOSE UR QL: NEGATIVE
HBA1C MFR BLD: 5.8 % (ref 4.8–5.6)
HCT VFR BLD AUTO: 41.2 % (ref 37.5–51)
HDLC SERPL-MCNC: 40 MG/DL
HGB BLD-MCNC: 13.6 G/DL (ref 13–17.7)
HGB UR QL STRIP: NEGATIVE
IMM GRANULOCYTES # BLD AUTO: 0 X10E3/UL (ref 0–0.1)
IMM GRANULOCYTES NFR BLD AUTO: 0 %
INTERPRETATION, 910389: NORMAL
KETONES UR QL STRIP: NEGATIVE
LDLC SERPL CALC-MCNC: 137 MG/DL (ref 0–99)
LEUKOCYTE ESTERASE UR QL STRIP: NEGATIVE
LYMPHOCYTES # BLD AUTO: 3.6 X10E3/UL (ref 0.7–3.1)
LYMPHOCYTES NFR BLD AUTO: 43 %
MCH RBC QN AUTO: 29.6 PG (ref 26.6–33)
MCHC RBC AUTO-ENTMCNC: 33 G/DL (ref 31.5–35.7)
MCV RBC AUTO: 90 FL (ref 79–97)
MICRO URNS: NORMAL
MICRO URNS: NORMAL
MICROALBUMIN UR-MCNC: <3 UG/ML
MONOCYTES # BLD AUTO: 0.5 X10E3/UL (ref 0.1–0.9)
MONOCYTES NFR BLD AUTO: 6 %
MUCOUS THREADS URNS QL MICRO: PRESENT
NEUTROPHILS # BLD AUTO: 4.1 X10E3/UL (ref 1.4–7)
NEUTROPHILS NFR BLD AUTO: 49 %
NITRITE UR QL STRIP: NEGATIVE
PH UR STRIP: 5.5 [PH] (ref 5–7.5)
PLATELET # BLD AUTO: 326 X10E3/UL (ref 150–379)
POTASSIUM SERPL-SCNC: 4.3 MMOL/L (ref 3.5–5.2)
PROT SERPL-MCNC: 7.7 G/DL (ref 6–8.5)
PROT UR QL STRIP: NEGATIVE
RBC # BLD AUTO: 4.6 X10E6/UL (ref 4.14–5.8)
RBC #/AREA URNS HPF: NORMAL /HPF
SODIUM SERPL-SCNC: 136 MMOL/L (ref 134–144)
SP GR UR: 1.01 (ref 1–1.03)
TRIGL SERPL-MCNC: 90 MG/DL (ref 0–149)
TSH SERPL DL<=0.005 MIU/L-ACNC: 1.3 UIU/ML (ref 0.45–4.5)
URINALYSIS REFLEX, 377202: NORMAL
UROBILINOGEN UR STRIP-MCNC: 0.2 MG/DL (ref 0.2–1)
VLDLC SERPL CALC-MCNC: 18 MG/DL (ref 5–40)
WBC # BLD AUTO: 8.4 X10E3/UL (ref 3.4–10.8)
WBC #/AREA URNS HPF: NORMAL /HPF

## 2019-03-08 NOTE — PROGRESS NOTES
Good improvement on the cholesterol. Your CRP is very high and you need to let your GI doctor know about the high inflammation. The blood sugar is still in the prediabetes range at 5. 8.

## 2019-03-13 ENCOUNTER — OFFICE VISIT (OUTPATIENT)
Dept: INTERNAL MEDICINE CLINIC | Age: 49
End: 2019-03-13

## 2019-03-13 VITALS
BODY MASS INDEX: 22.2 KG/M2 | RESPIRATION RATE: 16 BRPM | HEART RATE: 86 BPM | WEIGHT: 130 LBS | DIASTOLIC BLOOD PRESSURE: 60 MMHG | HEIGHT: 64 IN | OXYGEN SATURATION: 98 % | TEMPERATURE: 97.9 F | SYSTOLIC BLOOD PRESSURE: 110 MMHG

## 2019-03-13 DIAGNOSIS — R05.9 COUGH: Primary | ICD-10-CM

## 2019-03-13 DIAGNOSIS — J06.9 VIRAL UPPER RESPIRATORY TRACT INFECTION: ICD-10-CM

## 2019-03-13 RX ORDER — FLUTICASONE PROPIONATE 50 MCG
2 SPRAY, SUSPENSION (ML) NASAL DAILY
Qty: 1 BOTTLE | Refills: 0 | Status: SHIPPED | OUTPATIENT
Start: 2019-03-13 | End: 2019-05-14

## 2019-03-13 NOTE — PATIENT INSTRUCTIONS
Cough: Care Instructions  Your Care Instructions    A cough is your body's response to something that bothers your throat or airways. Many things can cause a cough. You might cough because of a cold or the flu, bronchitis, or asthma. Smoking, postnasal drip, allergies, and stomach acid that backs up into your throat also can cause coughs. A cough is a symptom, not a disease. Most coughs stop when the cause, such as a cold, goes away. You can take a few steps at home to cough less and feel better. Follow-up care is a key part of your treatment and safety. Be sure to make and go to all appointments, and call your doctor if you are having problems. It's also a good idea to know your test results and keep a list of the medicines you take. How can you care for yourself at home? · Drink lots of water and other fluids. This helps thin the mucus and soothes a dry or sore throat. Honey or lemon juice in hot water or tea may ease a dry cough. · Take cough medicine as directed by your doctor. · Prop up your head on pillows to help you breathe and ease a dry cough. · Try cough drops to soothe a dry or sore throat. Cough drops don't stop a cough. Medicine-flavored cough drops are no better than candy-flavored drops or hard candy. · Do not smoke. Avoid secondhand smoke. If you need help quitting, talk to your doctor about stop-smoking programs and medicines. These can increase your chances of quitting for good. When should you call for help? Call 911 anytime you think you may need emergency care.  For example, call if:    · You have severe trouble breathing.    Call your doctor now or seek immediate medical care if:    · You cough up blood.     · You have new or worse trouble breathing.     · You have a new or higher fever.     · You have a new rash.    Watch closely for changes in your health, and be sure to contact your doctor if:    · You cough more deeply or more often, especially if you notice more mucus or a change in the color of your mucus.     · You have new symptoms, such as a sore throat, an earache, or sinus pain.     · You do not get better as expected. Where can you learn more? Go to http://bibi-toño.info/. Enter D279 in the search box to learn more about \"Cough: Care Instructions. \"  Current as of: September 5, 2018  Content Version: 11.9  © 0274-0238 [employer]. Care instructions adapted under license by [employer] (which disclaims liability or warranty for this information). If you have questions about a medical condition or this instruction, always ask your healthcare professional. [address] any warranty or liability for your use of this information.

## 2019-03-13 NOTE — PROGRESS NOTES
HISTORY OF PRESENT ILLNESS  Guru Mckeon is a 51 y.o. male. Patient reports lingering dry cough x 2 weeks and right ear fullness since a flu-like illness about 3 weeks ago. He had a fever for a few days initially with productive cough, right ear popping, and congestion, but the cough is lingering. Congestion has improved. He reports a slight fever a few nights ago, but none since then. He has used cough syrup at night. Denies wheezing or shortness of breath. Visit Vitals  /60 (BP 1 Location: Left arm, BP Patient Position: Sitting)   Pulse 86   Temp 97.9 °F (36.6 °C) (Oral)   Resp 16   Ht 5' 4\" (1.626 m)   Wt 130 lb (59 kg)   SpO2 98%   BMI 22.31 kg/m²       HPI    Review of Systems   Respiratory: Positive for cough. Negative for sputum production, shortness of breath and wheezing. Physical Exam   Constitutional: He is oriented to person, place, and time. He appears well-developed and well-nourished. HENT:   Head: Normocephalic. Right Ear: Hearing, external ear and ear canal normal.   Left Ear: Hearing, external ear and ear canal normal.   Nose: Mucosal edema present. Right sinus exhibits no maxillary sinus tenderness and no frontal sinus tenderness. Left sinus exhibits no maxillary sinus tenderness and no frontal sinus tenderness. Mouth/Throat: Uvula is midline, oropharynx is clear and moist and mucous membranes are normal.   Clear fluid noted behind both TMs, bubbles behind right TM; right worse than left; no erythema; light reflex intact bilaterally   Neck: Normal range of motion. Neck supple. Cardiovascular: Normal rate, regular rhythm and normal heart sounds. Lymphadenopathy:     He has no cervical adenopathy. Neurological: He is alert and oriented to person, place, and time. Skin: Skin is warm and dry. Psychiatric: He has a normal mood and affect. ASSESSMENT and PLAN    ICD-10-CM ICD-9-CM    1. Cough R05 786.2    2.  Viral upper respiratory tract infection J06.9 465.9 Orders Placed This Encounter    fluticasone propionate (FLONASE) 50 mcg/actuation nasal spray   try flonase and antihistamine to dry up fluid behind TM  Follow-up next week if no improvement  Advised that dry cough may linger for a few weeks after URI

## 2019-05-14 ENCOUNTER — OFFICE VISIT (OUTPATIENT)
Dept: FAMILY MEDICINE CLINIC | Age: 49
End: 2019-05-14

## 2019-05-14 VITALS
DIASTOLIC BLOOD PRESSURE: 80 MMHG | HEIGHT: 64 IN | SYSTOLIC BLOOD PRESSURE: 104 MMHG | HEART RATE: 73 BPM | OXYGEN SATURATION: 98 % | BODY MASS INDEX: 22.16 KG/M2 | TEMPERATURE: 97.5 F | RESPIRATION RATE: 18 BRPM | WEIGHT: 129.8 LBS

## 2019-05-14 DIAGNOSIS — H65.93 BILATERAL OTITIS MEDIA WITH EFFUSION: ICD-10-CM

## 2019-05-14 DIAGNOSIS — Z23 ENCOUNTER FOR IMMUNIZATION: ICD-10-CM

## 2019-05-14 DIAGNOSIS — K51.90 ULCERATIVE COLITIS WITHOUT COMPLICATIONS, UNSPECIFIED LOCATION (HCC): ICD-10-CM

## 2019-05-14 DIAGNOSIS — Z86.018 HISTORY OF LIPOMA: ICD-10-CM

## 2019-05-14 DIAGNOSIS — Z76.89 ENCOUNTER TO ESTABLISH CARE WITH NEW DOCTOR: Primary | ICD-10-CM

## 2019-05-14 DIAGNOSIS — R73.03 PREDIABETES: ICD-10-CM

## 2019-05-14 RX ORDER — AMPICILLIN TRIHYDRATE 250 MG
600 CAPSULE ORAL
COMMUNITY

## 2019-05-14 RX ORDER — CHOLECALCIFEROL (VITAMIN D3) 125 MCG
5000 CAPSULE ORAL DAILY
COMMUNITY
End: 2019-12-18

## 2019-05-14 NOTE — PATIENT INSTRUCTIONS
Lipoma: Care Instructions  Your Care Instructions  A lipoma is a growth of fat just below the skin. It may feel soft and rubbery. Lipomas can occur anywhere on the body. But they are most common on the torso, neck, upper thighs, upper arms, and armpits. A lipoma does not turn into cancer. Lipomas usually are not treated, because most of them don't hurt or cause problems. But your doctor may remove a lipoma if it is painful, gets infected, or bothers you. Follow-up care is a key part of your treatment and safety. Be sure to make and go to all appointments, and call your doctor if you are having problems. It's also a good idea to know your test results and keep a list of the medicines you take. How can you care for yourself at home? · A lipoma usually needs no care at home unless your doctor made a cut (incision) to remove it. · If your doctor told you how to care for your incision, follow your doctor's instructions. If you did not get instructions, follow this general advice:  ? Wash around the incision with clean water 2 times a day. Don't use hydrogen peroxide or alcohol. These can slow healing. ? You may cover the incision with a thin layer of petroleum jelly, such as Vaseline, and a nonstick bandage. ? Apply more petroleum jelly and replace the bandage as needed. When should you call for help? Call your doctor now or seek immediate medical care if:    · You have signs of infection, such as:  ? Increased pain, swelling, warmth, or redness. ? Red streaks leading from the lipoma. ? Pus draining from the lipoma. ? A fever.    Watch closely for changes in your health, and be sure to contact your doctor if:    · The lipoma is growing or changing.     · You do not get better as expected. Where can you learn more? Go to http://bibi-toño.info/. Enter C012 in the search box to learn more about \"Lipoma: Care Instructions. \"  Current as of: April 17, 2018  Content Version: 11.9  © 6175-4082 Healthwise, GMG[employer]. Care instructions adapted under license by [employer] (which disclaims liability or warranty for this information). If you have questions about a medical condition or this instruction, always ask your healthcare professional. [address] any warranty or liability for your use of this information. Vaccine Information Statement     Td (Tetanus, Diphtheria) Vaccine: What You Need to Know    Many Vaccine Information Statements are available in [place] and other languages. See www.immunize.org/vis. Hojas de información Sobre Vacunas están disponibles en español y en muchos otros idiomas. [facility]    1. Why get vaccinated? Tetanus and diphtheria are very serious diseases. They are rare in the [place] today, but people who do become infected often have severe complications. Td vaccine is used to protect adolescents and adults from both of these diseases. Both diphtheria and tetanus are infections caused by bacteria. Diphtheria spreads from person to person through secretions from coughing or sneezing. Tetanus-causing bacteria enter the body through cuts, scratches, or wounds. TETANUS (Lockjaw) causes painful muscle tightening and stiffness, usually all over the body.  It can lead to tightening of muscles in the head and neck so you cant open your mouth, swallow, or sometimes even breathe. Tetanus kills about 1 out of every 10 people who are infected even after receiving the best medical care. DIPHTHERIA can cause a thick coating to form in the back of the throat.  It can lead to breathing problems, heart failure, paralysis, and death. Before vaccines, as many as 200,000 cases of diphtheria and hundreds of cases of tetanus were reported in the [place] each year. Since vaccination began, reports of cases for both diseases have dropped by about 99%.       2. Td vaccine    Td vaccine can protect adolescents and adults from tetanus and diphtheria. Td is usually given as a booster dose every 10 years but it can also be given earlier after a severe and dirty wound or burn. Another vaccine, called Tdap, which protects against pertussis in addition to tetanus and diphtheria, is sometimes recommended instead of Td vaccine. Your doctor or the person giving you the vaccine can give you more information. Td may safely be given at the same time as other vaccines. 3. Some people should not get this vaccine     A person who has ever had a life-threatening allergic reaction after a previous dose of any tetanus or diphtheria containing vaccine, OR has a severe allergy to any part of this vaccine, should not get Td vaccine. Tell the person giving the vaccine about any severe allergies.  Talk to your doctor if you:  o had severe pain or swelling after any vaccine containing diphtheria or tetanus,   o ever had a condition called Guillain Barré Syndrome (GBS),  o arent feeling well on the day the shot is scheduled. 4. Risks of a vaccine reaction    With any medicine, including vaccines, there is a chance of side effects. These are usually mild and go away on their own. Serious reactions are also possible but are rare. Most people who get Td vaccine do not have any problems with it.     Mild Problems following Td vaccine:  (Did not interfere with activities)   Pain where the shot was given (about 8 people in 10)   Redness or swelling where the shot was given (about 1 person in 4)   Mild fever (rare)   Headache  (about 1 person in 4)   Tiredness (about 1 person in 4)    Moderate Problems following Td vaccine:  (Interfered with activities, but did not require medical attention)   Fever over 102°F (rare)     Severe Problems following Td vaccine:  (Unable to perform usual activities; required medical attention)   Swelling, severe pain, bleeding and/or redness in the arm where the shot was given (rare). Problems that could happen after any vaccine:     People sometimes faint after a medical procedure, including vaccination. Sitting or lying down for about 15 minutes can help prevent fainting, and injuries caused by a fall. Tell your doctor if you feel dizzy, or have vision changes or ringing in the ears.  Some people get severe pain in the shoulder and have difficulty moving the arm where a shot was given. This happens very rarely.  Any medication can cause a severe allergic reaction. Such reactions from a vaccine are very rare, estimated at fewer than 1 in a million doses, and would happen within a few minutes to a few hours after the vaccination. As with any medicine, there is a very remote chance of a vaccine causing a serious injury or death. The safety of vaccines is always being monitored. For more information, visit: www.cdc.gov/vaccinesafety/      5. What if there is a serious reaction? What should I look for?  Look for anything that concerns you, such as signs of a severe allergic reaction, very high fever, or unusual behavior. Signs of a severe allergic reaction can include hives, swelling of the face and throat, difficulty breathing, a fast heartbeat, dizziness, and weakness. These would usually start a few minutes to a few hours after the vaccination. What should I do?  If you think it is a severe allergic reaction or other emergency that cant wait, call 9-1-1 or get the person to the nearest hospital. Otherwise, call your doctor.  Afterward, the reaction should be reported to the Vaccine Adverse Event Reporting System (VAERS). Your doctor might file this report, or you can do it yourself through the VAERS web site at www.vaers. hhs.gov, or by calling 7-981.563.9748. VAERS does not give medical advice.     6. The National Vaccine Injury Compensation Program    The [facility] Vaccine Injury Compensation Program (VICP) is a federal program that was created to compensate people who may have been injured by certain vaccines. Persons who believe they may have been injured by a vaccine can learn about the program and about filing a claim by calling 0-140.306.2315 or visiting the [employer] website at www.[facility].gov/vaccinecompensation. There is a time limit to file a claim for compensation. 7. How can I learn more?  Ask your doctor. He or she can give you the vaccine package insert or suggest other sources of information.  Call your local or state health department.  Contact the Centers for Disease Control and Prevention (CDC):  - Call 8-107.551.3108 (1-800-CDC-INFO) or  - Visit CDCs website at www.cdc.gov/vaccines      Vaccine Information Statement   Td Vaccine  (4/11/2017)  42 PATI Shanks 291CG-30    Department of Health and Human Services  Centers for Disease Control and Prevention    Office Use Only

## 2019-05-14 NOTE — PROGRESS NOTES
Chief Complaint   Patient presents with    New Patient    Blood sugar problem     follow up     1. Have you been to the ER, urgent care clinic since your last visit? Hospitalized since your last visit? No    2. Have you seen or consulted any other health care providers outside of the [address] [place] [address] since your last visit? Include any pap smears or colon screening.  No

## 2019-05-14 NOTE — PROGRESS NOTES
Patient Name: Anna Thomas   MRN: [id number]    Omid Jay is a 51 y.o. male who presents with the following: Here to establish care with new PCP. Requesting referral for a new dermatologist.  Has a history of recurrent benign lipomas and often needs to have them removed. Ulcerative colitis is well controlled on mesalamine, followed by GI. Not up-to-date on his tetanus booster. Inquiring more information on being prediabetic. Reports several week history of intermittent right ear pressure. Was evaluated about 2 months ago and was told that he had fluid in his right ear. Took Flonase for about a week which helped but it still occasionally has a cracking noise in his right ear. Denies any teeth grinding or jaw pain. Does travel a lot on planes. Review of Systems   Constitutional: Negative for fever, malaise/fatigue and weight loss. HENT: Negative for congestion, ear pain, hearing loss and tinnitus. Respiratory: Negative for cough, hemoptysis, shortness of breath and wheezing. Cardiovascular: Negative for chest pain, palpitations, leg swelling and PND. Gastrointestinal: Negative for abdominal pain, constipation, diarrhea, nausea and vomiting. The patient's medications, allergies, past medical history, surgical history, family history and social history were reviewed and updated where appropriate. Prior to Admission medications    Medication Sig Start Date End Date Taking? Authorizing Provider   CHROMIUM PICOLINATE PO Take 1 Tab by mouth daily. Yes Provider, Historical   red yeast rice extract 600 mg cap Take 600 mg by mouth now. Yes Provider, Historical   MAGNESIUM GLYCINATE PO Take 500 mg by mouth daily. Yes Provider, Historical   cholecalciferol, vitamin D3, (VITAMIN D3) 2,000 unit tab Take 5,000 Units by mouth daily. Yes Provider, Historical   mesalamine (LIALDA) 1.2 gram delayed release tablet Take 2.4 g by mouth daily.  1/16/18  Yes Provider, Historical FOLIC ACID/MULTIVIT,IRON, (CENTRUM PO) Take 1 Tab by mouth daily. Yes Provider, Historical   fluticasone propionate (FLONASE) 50 mcg/actuation nasal spray 2 Sprays by Both Nostrils route daily. 3/13/19   Ivette Valle NP       Allergies   Allergen Reactions    Other Medication Rash and Itching     Skin rash with itching after application of skin glue on surgical site         Past Medical History:   Diagnosis Date    Sun-damaged skin     [facility]63[facility]       Past Surgical History:   Procedure Laterality Date    HX TUMOR REMOVAL  2013    lipomas removed from different areas        Family History   Problem Relation Age of Onset    Hypertension Mother     Thyroid Disease Father         hyper    Thyroid Disease Sister         hyper       Social History     Socioeconomic History    Marital status:      Spouse name: Not on file    Number of children: Not on file    Years of education: Not on file    Highest education level: Not on file   Occupational History    Occupation: [employer]   Social Needs    Financial resource strain: Not on file    Food insecurity:     Worry: Not on file     Inability: Not on file    Transportation needs:     Medical: Not on file     Non-medical: Not on file   Tobacco Use    Smoking status: Never Smoker    Smokeless tobacco: Never Used   Substance and Sexual Activity    Alcohol use:  Yes     Alcohol/week: 1.2 oz     Types: 1 Glasses of wine, 1 Shots of liquor per week     Comment: 4 per week    Drug use: No    Sexual activity: Yes     Partners: Female   Lifestyle    Physical activity:     Days per week: Not on file     Minutes per session: Not on file    Stress: Not on file   Relationships    Social connections:     Talks on phone: Not on file     Gets together: Not on file     Attends Shinto service: Not on file     Active member of club or organization: Not on file     Attends meetings of clubs or organizations: Not on file     Relationship status: Not on file    Intimate partner violence:     Fear of current or ex partner: Not on file     Emotionally abused: Not on file     Physically abused: Not on file     Forced sexual activity: Not on file   Other Topics Concern    Not on file   Social History Narrative    Not on file         OBJECTIVE    Visit Vitals  /80 (BP 1 Location: Left arm, BP Patient Position: Sitting)   Pulse 73   Temp 97.5 °F (36.4 °C) (Oral)   Resp 18   Ht 5' 4\" (1.626 m)   Wt 129 lb 12.8 oz (58.9 kg)   SpO2 98%   BMI 22.28 kg/m²       Physical Exam   Constitutional: He is oriented to person, place, and time and well-developed, well-nourished, and in no distress. No distress. HENT:   Head: Normocephalic and atraumatic. Right Ear: Tympanic membrane is not perforated and not erythematous. A middle ear effusion is present. No decreased hearing is noted. Left Ear: Tympanic membrane is not perforated and not erythematous. A middle ear effusion is present. No decreased hearing is noted. Eyes: Pupils are equal, round, and reactive to light. Conjunctivae and EOM are normal.   Cardiovascular: Normal rate, regular rhythm and normal heart sounds. Exam reveals no gallop and no friction rub. No murmur heard. Pulmonary/Chest: Effort normal and breath sounds normal. No respiratory distress. He has no wheezes. Neurological: He is alert and oriented to person, place, and time. Skin: Skin is warm and dry. No rash noted. He is not diaphoretic. Psychiatric: Mood, memory, affect and judgment normal.   Nursing note and vitals reviewed. ASSESSMENT AND PLAN  Blanche Lemon is a 51 y.o. male who presents today for:    1. Encounter to establish care with new doctor    2. History of lipoma  - REFERRAL TO DERMATOLOGY    3. Ulcerative colitis without complications, unspecified location ([address]  Stable, continue current treatment.     4. Prediabetes  I have reviewed/discussed the above normal BMI with the patient. I have recommended the following interventions: dietary management education, guidance, and counseling, encourage exercise, monitor weight and prescribed dietary intake. 5. Bilateral otitis media with effusion  Recommend Flonase daily. 6. Encounter for immunization  - TETANUS AND DIPHTHERIA TOXOIDS (TD) ADSORBED, PRES. FREE, IN INDIVIDS. >=7, IM  - [place] IMMUNIZ ADMIN,1 SINGLE/COMB VAC/TOXOID       Medications Discontinued During This Encounter   Medication Reason    fluticasone propionate (FLONASE) 50 mcg/actuation nasal spray        Follow-up and Dispositions    · Return in about 10 months (around 3/14/2020) for CPE (30 min). Medication risks/benefits/costs/interactions/alternatives discussed with patient. Advised patient to call back or return to office if symptoms worsen/change/persist. If patient cannot reach us or should anything more severe/urgent arise he/she should proceed directly to the nearest emergency department. Discussed expected course/resolution/complications of diagnosis in detail with patient. Patient given a written after visit summary which includes his/her diagnoses, current medications and vitals. Patient expressed understanding with the diagnosis and plan. Laura Hobbs M.D.

## 2019-06-27 ENCOUNTER — TELEPHONE (OUTPATIENT)
Dept: FAMILY MEDICINE CLINIC | Age: 49
End: 2019-06-27

## 2019-06-27 NOTE — TELEPHONE ENCOUNTER
Patient already has an appointment for pre op on 6/28/2019 with SHEREEN GOLDMAN/[facility]

## 2019-06-27 NOTE — TELEPHONE ENCOUNTER
----- Message from Nicol Burgos sent at 6/27/2019  9:48 AM EDT -----  Regarding: Dr. Amanda Davis  PT having surgery on 7/10 at 10:30am at [facility], with Dr. Cb Pablo, contact [phone], fax [phone]. PT needs a form filled out and faxed over to Dr. Rosalio Green. Best contact [phone], or office [phone] during 9am-5pm.    Outbound call to pt, LVM to call us back to get a pre-op apt as these forms are for his surgery.

## 2019-06-28 ENCOUNTER — OFFICE VISIT (OUTPATIENT)
Dept: FAMILY MEDICINE CLINIC | Age: 49
End: 2019-06-28

## 2019-06-28 VITALS
RESPIRATION RATE: 16 BRPM | DIASTOLIC BLOOD PRESSURE: 71 MMHG | OXYGEN SATURATION: 97 % | HEART RATE: 72 BPM | HEIGHT: 64 IN | SYSTOLIC BLOOD PRESSURE: 104 MMHG | BODY MASS INDEX: 21.85 KG/M2 | TEMPERATURE: 97.7 F | WEIGHT: 128 LBS

## 2019-06-28 DIAGNOSIS — D17.20 LIPOMA OF UPPER EXTREMITY, UNSPECIFIED LATERALITY: ICD-10-CM

## 2019-06-28 DIAGNOSIS — Z01.818 PRE-OP EVALUATION: Primary | ICD-10-CM

## 2019-06-28 DIAGNOSIS — S29.012A STRAIN OF RHOMBOID MUSCLE, INITIAL ENCOUNTER: ICD-10-CM

## 2019-06-28 NOTE — PROGRESS NOTES
[facility] Note  Subjective:      Eran Harrington is a 51 y.o. male who presents for pre-op physical.   Chief Complaint   Patient presents with    Surg H&P     pt has H&P form to be completed for Excision of Lipoma's on both arms by Dr. Farhan Elizabeth     Pre Op  Procedure: Excision of lipoma of bilateral arms. Expected Date: 7/10/2019  Surgeon: Dr. Feliberto Razo at [address] [place] [address]. Hx of complications with general anesthesia: Denies   Signs and symptoms of cardiovascular disease:  Denies   Have any of the following: CVA, CHF, Cr > 2.0, insulin-dependent DM, ischemic cardiac disease, or suprainguinal vascular/intrathroacic/intraabdominal surgery:  Denies   Able to meet > 4 METS: Yes; strenuous exercise without difficulty. STOP-BANG (snoring, tiredness, observed apnea, high BP, BMI>35, age >47, neck circumference> 15 in, male): 3+ risk factors: No.       Current Outpatient Medications   Medication Sig Dispense Refill    fish oil-omega-3 fatty acids 340-1,000 mg capsule Take 1 Cap by mouth daily.  CHROMIUM PICOLINATE PO Take 1 Tab by mouth daily.  red yeast rice extract 600 mg cap Take 600 mg by mouth now.  MAGNESIUM GLYCINATE PO Take 500 mg by mouth daily.  cholecalciferol, vitamin D3, (VITAMIN D3) 2,000 unit tab Take 5,000 Units by mouth daily.  mesalamine (LIALDA) 1.2 gram delayed release tablet Take 2.4 g by mouth daily.  FOLIC ACID/MULTIVIT,IRON, (CENTRUM PO) Take 1 Tab by mouth daily.        Allergies   Allergen Reactions    Other Medication Rash and Itching     Skin rash with itching after application of skin glue on surgical site     Past Medical History:   Diagnosis Date    Hyperlipidemia     Lipoma     Sun-damaged skin     [facility]     Past Surgical History:   Procedure Laterality Date    HX COLONOSCOPY      HX TUMOR REMOVAL  2013    lipomas removed from different areas Family History   Problem Relation Age of Onset    Hypertension Mother     Thyroid Disease Father         hyper    Thyroid Disease Sister         hyper         ROS:   Complete review of systems was reviewed with pertinent information listed in HPI. Review of Systems   Constitutional: Negative for chills, diaphoresis, fever, malaise/fatigue and weight loss. HENT: Negative for congestion, ear pain, hearing loss, sinus pain, sore throat and tinnitus. Eyes: Negative for blurred vision and double vision. Respiratory: Negative for cough, hemoptysis, sputum production, shortness of breath and wheezing. Cardiovascular: Negative for chest pain, palpitations, orthopnea, claudication, leg swelling and PND. Gastrointestinal: Negative for abdominal pain, blood in stool, constipation, diarrhea, heartburn, melena, nausea and vomiting. Genitourinary: Negative for dysuria, frequency, hematuria and urgency. Musculoskeletal: Negative for joint pain and myalgias. Tension of left upper back, started today. Skin: Negative for itching and rash. Lipomas of right and left arm   Neurological: Negative for dizziness, tingling, tremors, sensory change, speech change, focal weakness, weakness and headaches. Endo/Heme/Allergies: Negative for polydipsia. Psychiatric/Behavioral: Negative. Objective:     Visit Vitals  /71 (BP 1 Location: Left arm, BP Patient Position: Sitting)   Pulse 72   Temp 97.7 °F (36.5 °C) (Oral)   Resp 16   Ht 5' 4\" (1.626 m)   Wt 128 lb (58.1 kg)   SpO2 97%   BMI 21.97 kg/m²       Vitals and Nurse Documentation reviewed. Physical Exam   Constitutional: He is oriented to person, place, and time and well-developed, well-nourished, and in no distress. Vital signs are normal.   HENT:   Head: Normocephalic and atraumatic. Right Ear: Hearing, tympanic membrane, external ear and ear canal normal. Tympanic membrane is not erythematous. No middle ear effusion.    Left Ear: Hearing, tympanic membrane, external ear and ear canal normal. Tympanic membrane is not erythematous. No middle ear effusion. Nose: Nose normal. No nasal deformity or septal deviation. Mouth/Throat: Uvula is midline, oropharynx is clear and moist and mucous membranes are normal. Mucous membranes are not pale, not dry and not cyanotic. Normal dentition. No dental caries. No oropharyngeal exudate, posterior oropharyngeal edema, posterior oropharyngeal erythema or tonsillar abscesses. Eyes: Pupils are equal, round, and reactive to light. Conjunctivae and lids are normal. Right conjunctiva is not injected. Left conjunctiva is not injected. Neck: Trachea normal, normal range of motion and phonation normal. Neck supple. No tracheal deviation present. Cardiovascular: Normal rate, regular rhythm, S1 normal, S2 normal, normal heart sounds and intact distal pulses. Exam reveals no gallop and no friction rub. No murmur heard. Pulses:       Radial pulses are 2+ on the right side, and 2+ on the left side. Dorsalis pedis pulses are 2+ on the right side, and 2+ on the left side. Pulmonary/Chest: Effort normal and breath sounds normal. No accessory muscle usage. No respiratory distress. He has no decreased breath sounds. He has no wheezes. He has no rhonchi. He has no rales. He exhibits no tenderness. Abdominal: Soft. Normal appearance and bowel sounds are normal. He exhibits no distension, no abdominal bruit and no mass. There is no hepatosplenomegaly. There is no tenderness. There is no rebound and no guarding. Musculoskeletal: Normal range of motion. He exhibits no edema, tenderness or deformity. Cervical back: Normal. He exhibits normal range of motion, no tenderness, no bony tenderness, no swelling, no edema and no deformity.         Back:    Lymphadenopathy:        Head (right side): No submental, no submandibular, no tonsillar, no preauricular, no posterior auricular and no occipital adenopathy present. Head (left side): No submental, no submandibular, no tonsillar, no preauricular, no posterior auricular and no occipital adenopathy present. He has no cervical adenopathy. Right: No supraclavicular adenopathy present. Left: No supraclavicular adenopathy present. Neurological: He is alert and oriented to person, place, and time. He has normal strength and intact cranial nerves. Gait normal.   Skin: Skin is warm, dry and intact. No rash noted. He is not diaphoretic. No cyanosis. Nails show no clubbing. Soft subcutaneous nodules of right and left upper inner upper extremities. Psychiatric: Mood and affect normal.   Nursing note and vitals reviewed. Assessment/Plan:     Diagnoses and all orders for this visit:    1. Pre-op evaluation: Cleared for procedure, no contraindications. Revised Cardiac Risk Index of a major cardiac event is:  No risk factors - 0.4 percent  These risk calculators have been reviewed with the patient who expressed understanding of the relative cardiac risk of his/her upcoming procedure given his/her current risk factors. According to the [address] [place] [address] St of Cardiology and AHA Guidelines of perioperative cardiovascular evaluation for noncardiac surgery, patient is cleared for low risk procedure. (No clinical predictors, low procedure, > 4 METS). 2. Lipoma of upper extremity, unspecified laterality: Managed by specialist.     3. Rhomboid muscle tension: Acute issue, alternate heat/ice, start home stretches. RTC if symptoms worsen or do not resolve.      Follow-up and Dispositions    · Return in about 7 months (around 1/28/2020) for Routine Physical Exam.         Discussed expected course/resolution/complications of diagnosis in detail with patient.    Medication risks/benefits/costs/interactions/alternatives discussed with patient.    Pt was given an after visit summary which includes diagnoses, current medications & vitals.  Pt expressed understanding with the diagnosis and plan

## 2019-06-28 NOTE — PROGRESS NOTES
Chief Complaint   Patient presents with    Surg H&P     pt has H&P form to be completed for Excision of Lipoma's on both arms. \"REVIEWED RECORD IN PREPARATION FOR VISIT AND HAVE OBTAINED THE NECESSARY DOCUMENTATION\"  1. Have you been to the ER, urgent care clinic since your last visit? Hospitalized since your last visit? No    2. Have you seen or consulted any other health care providers outside of the [address] [place] [address] since your last visit? Include any pap smears or colon screening.  Yes Reason for visit: Dr. Inocencio Mayorga

## 2019-06-28 NOTE — PATIENT INSTRUCTIONS
Rhomboid Muscle Strain: Rehab Exercises  Your Care Instructions  Here are some examples of typical rehabilitation exercises for your condition. Start each exercise slowly. Ease off the exercise if you start to have pain. Your doctor or physical therapist will tell you when you can start these exercises and which ones will work best for you. How to do the exercises  Lower neck and upper back (rhomboid) stretch    1. Stretch your arms out in front of your body. Clasp one hand on top of your other hand. 2. Gently reach out so that you feel your shoulder blades stretching away from each other. 3. Gently bend your head forward. 4. Hold for 15 to 30 seconds. 5. Repeat 2 to 4 times. Resisted rows    1. Put the band around a solid object, such as a bedpost, at about waist level. Stand facing where you have placed the band. Hold equal lengths of the band in each hand. 2. Start with your arms held out in front of you. 3. Pull the bands back, and move your shoulder blades together. As you finish, your elbows should be at your side and bent at 90 degrees (like the angle of the letter \"L\"). 4. Return to the starting position. 5. Repeat 8 to 12 times. Neck stretches    1. Look straight ahead, and tip your right ear to your right shoulder. Do not let your left shoulder rise as you tip your head to the right. 2. Hold for 15 to 30 seconds. 3. Tilt your head to the left. Do not let your right shoulder rise as you tip your head to the left. 4. Hold for 15 to 30 seconds. 5. Repeat 2 to 4 times to each side. Neck rotation    1. Sit in a firm chair, or stand up straight. 2. Keeping your chin level, turn your head to the right, and hold for 15 to 30 seconds. 3. Turn your head to the left, and hold for 15 to 30 seconds. 4. Repeat 2 to 4 times to each side. Follow-up care is a key part of your treatment and safety. Be sure to make and go to all appointments, and call your doctor if you are having problems. It's also a good idea to know your test results and keep a list of the medicines you take. Where can you learn more? Go to http://bibi-toño.info/. Enter 0841 [id number] in the search box to learn more about \"Rhomboid Muscle Strain: Rehab Exercises. \"  Current as of: September 20, 2018  Content Version: 11.9  © 6817-1467 [employer] Care instructions adapted under license by [employer] (which disclaims liability or warranty for this information). If you have questions about a medical condition or this instruction, always ask your healthcare professional. [place] [address] any warranty or liability for your use of this information. How to Prepare for Surgery  How do you prepare for surgery? Surgery can be stressful. This information will help you understand what you can expect. And it will help you safely prepare for surgery. Follow-up care is a key part of your treatment and safety. Be sure to make and go to all appointments, and call your doctor if you are having problems. It's also a good idea to know your test results and keep a list of the medicines you take. What happens before surgery?   Preparing for surgery    · Understand exactly what surgery is planned, along with the risks, benefits, and other options. · Tell your doctors ALL the medicines, vitamins, supplements, and herbal remedies you take. Some of these can increase the risk of bleeding or interact with anesthesia.     · If you take blood thinners, such as warfarin (Coumadin), clopidogrel (Plavix), or aspirin, be sure to talk to your doctor. He or she will tell you if you should stop taking these medicines before your surgery. Make sure that you understand exactly what your doctor wants you to do.     · Your doctor will tell you which medicines to take or stop before your surgery. You may need to stop taking certain medicines a week or more before surgery.  So talk to your doctor as soon as you can.     · If you have an advance directive, let your doctor know. It may include a living will and a durable power of  for health care. Bring a copy to the hospital. If don't have one, you may want to prepare one. It lets your doctor and loved ones know your health care wishes. Doctors advise that everyone prepare these papers before any type of surgery or procedure. What happens on the day of surgery?    · Follow the instructions about when to stop eating and drinking. If you don't, your surgery may be canceled. If your doctor told you to take your medicines on the day of surgery, take them with only a sip of water.     · Take a bath or shower before coming in for your surgery. Do not apply lotions, perfumes, deodorants, or nail polish.     · Do not shave the surgical site yourself.     · Take off all jewelry and piercings. And take out contact lenses, if you wear them.    At the hospital or surgery center   · Bring a picture ID.     · The area for surgery is often marked to make sure there are no errors.     · You will be kept comfortable and safe by your anesthesia provider. The anesthesia may make you sleep. Or it may just numb the area being worked on. Going home   · Be sure you have someone to drive you home. Anesthesia and pain medicine make it unsafe for you to drive.     · You will be given more specific instructions about recovering from your surgery. They will cover things like diet, wound care, follow-up care, driving, and getting back to your normal routine. When should you call your doctor? · You have questions or concerns.     · You don't understand how to prepare for your surgery.     · You become ill before the surgery (such as fever, flu, or a cold).     · You need to reschedule or have changed your mind about having the surgery. Where can you learn more? Go to http://bibi-toño.info/.   Enter Q250 in the search box to learn more about \"How to Prepare for Surgery. \"  Current as of: March 28, 2018  Content Version: 11.9  © 7973-0964 [employer]. Care instructions adapted under license by [employer] (which disclaims liability or warranty for this information). If you have questions about a medical condition or this instruction, always ask your healthcare professional. [place] [address] any warranty or liability for your use of this information.

## 2019-12-18 ENCOUNTER — OFFICE VISIT (OUTPATIENT)
Dept: FAMILY MEDICINE CLINIC | Age: 49
End: 2019-12-18

## 2019-12-18 VITALS
HEART RATE: 73 BPM | OXYGEN SATURATION: 99 % | DIASTOLIC BLOOD PRESSURE: 89 MMHG | WEIGHT: 130 LBS | RESPIRATION RATE: 16 BRPM | SYSTOLIC BLOOD PRESSURE: 133 MMHG | TEMPERATURE: 98.2 F | HEIGHT: 64 IN | BODY MASS INDEX: 22.2 KG/M2

## 2019-12-18 DIAGNOSIS — J40 BRONCHITIS: Primary | ICD-10-CM

## 2019-12-18 DIAGNOSIS — H10.33 ACUTE CONJUNCTIVITIS OF BOTH EYES, UNSPECIFIED ACUTE CONJUNCTIVITIS TYPE: ICD-10-CM

## 2019-12-18 RX ORDER — BENZONATATE 100 MG/1
100 CAPSULE ORAL
Qty: 21 CAP | Refills: 0 | Status: SHIPPED | OUTPATIENT
Start: 2019-12-18 | End: 2019-12-25

## 2019-12-18 NOTE — PROGRESS NOTES
Chief Complaint   Patient presents with    Cough     pt states he has a persistent cough. [facility] and [facility] fluzone and eye drops. . on Dec 4th. \"REVIEWED RECORD IN PREPARATION FOR VISIT AND HAVE OBTAINED THE NECESSARY DOCUMENTATION\"  1. Have you been to the ER, urgent care clinic since your last visit? Hospitalized since your last visit? No  Pt remembers [facility].    2. Have you seen or consulted any other health care providers outside of the [address] [place] [address] since your last visit? Include any pap smears or colon screening.  Yes Where: [facility] at [facility]

## 2019-12-18 NOTE — PATIENT INSTRUCTIONS
Bronchitis: Care Instructions  Your Care Instructions    Bronchitis is inflammation of the bronchial tubes, which carry air to the lungs. The tubes swell and produce mucus, or phlegm. The mucus and inflamed bronchial tubes make you cough. You may have trouble breathing. Most cases of bronchitis are caused by viruses like those that cause colds. Antibiotics usually do not help and they may be harmful. Bronchitis usually develops rapidly and lasts about 2 to 3 weeks in otherwise healthy people. Follow-up care is a key part of your treatment and safety. Be sure to make and go to all appointments, and call your doctor if you are having problems. It's also a good idea to know your test results and keep a list of the medicines you take. How can you care for yourself at home? · Take all medicines exactly as prescribed. Call your doctor if you think you are having a problem with your medicine. · Get some extra rest.  · Take an over-the-counter pain medicine, such as acetaminophen (Tylenol), ibuprofen (Advil, Motrin), or naproxen (Aleve) to reduce fever and relieve body aches. Read and follow all instructions on the label. · Do not take two or more pain medicines at the same time unless the doctor told you to. Many pain medicines have acetaminophen, which is Tylenol. Too much acetaminophen (Tylenol) can be harmful. · Take an over-the-counter cough medicine that contains dextromethorphan to help quiet a dry, hacking cough so that you can sleep. Avoid cough medicines that have more than one active ingredient. Read and follow all instructions on the label. · Breathe moist air from a humidifier, hot shower, or sink filled with hot water. The heat and moisture will thin mucus so you can cough it out. · Do not smoke. Smoking can make bronchitis worse. If you need help quitting, talk to your doctor about stop-smoking programs and medicines. These can increase your chances of quitting for good.   When should you call for help? Call 911 anytime you think you may need emergency care. For example, call if:    · You have severe trouble breathing.    Call your doctor now or seek immediate medical care if:    · You have new or worse trouble breathing.     · You cough up dark brown or bloody mucus (sputum).     · You have a new or higher fever.     · You have a new rash.    Watch closely for changes in your health, and be sure to contact your doctor if:    · You cough more deeply or more often, especially if you notice more mucus or a change in the color of your mucus.     · You are not getting better as expected. Where can you learn more? Go to http://bibi-toño.info/. Enter H333 in the search box to learn more about \"Bronchitis: Care Instructions. \"  Current as of: June 9, 2019  Content Version: 12.2  © 3579-6727 [employer]. Care instructions adapted under license by [employer] (which disclaims liability or warranty for this information). If you have questions about a medical condition or this instruction, always ask your healthcare professional. [place] [address] any warranty or liability for your use of this information. Pinkeye: Care Instructions  Your Care Instructions    Pinkeye is redness and swelling of the eye surface and the conjunctiva (the lining of the eyelid and the covering of the white part of the eye). Pinkeye is also called conjunctivitis. Pinkeye is often caused by infection with bacteria or a virus. Dry air, allergies, smoke, and chemicals are other common causes. Pinkeye often clears on its own in 7 to 10 days. Antibiotics only help if the pinkeye is caused by bacteria. Pinkeye caused by infection spreads easily. If an allergy or chemical is causing pinkeye, it will not go away unless you can avoid whatever is causing it. Follow-up care is a key part of your treatment and safety.  Be sure to make and go to all appointments, and call your doctor if you are having problems. It's also a good idea to know your test results and keep a list of the medicines you take. How can you care for yourself at home? · Wash your hands often. Always wash them before and after you treat pinkeye or touch your eyes or face. · Use moist cotton or a clean, wet cloth to remove crust. Wipe from the inside corner of the eye to the outside. Use a clean part of the cloth for each wipe. · Put cold or warm wet cloths on your eye a few times a day if the eye hurts. · Do not wear contact lenses or eye makeup until the pinkeye is gone. Throw away any eye makeup you were using when you got pinkeye. Clean your contacts and storage case. If you wear disposable contacts, use a new pair when your eye has cleared and it is safe to wear contacts again. · If the doctor gave you antibiotic ointment or eyedrops, use them as directed. Use the medicine for as long as instructed, even if your eye starts looking better soon. Keep the bottle tip clean, and do not let it touch the eye area. · To put in eyedrops or ointment:  ? Tilt your head back, and pull your lower eyelid down with one finger. ? Drop or squirt the medicine inside the lower lid. ? Close your eye for 30 to 60 seconds to let the drops or ointment move around. ? Do not touch the ointment or dropper tip to your eyelashes or any other surface. · Do not share towels, pillows, or washcloths while you have pinkeye. When should you call for help?   Call your doctor now or seek immediate medical care if:    · You have pain in your eye, not just irritation on the surface.     · You have a change in vision or loss of vision.     · You have an increase in discharge from the eye.     · Your eye has not started to improve or begins to get worse within 48 hours after you start using antibiotics.     · Pinkeye lasts longer than 7 days.    Watch closely for changes in your health, and be sure to contact your doctor if you have any problems. Where can you learn more? Go to http://bibi-toño.info/. Enter Y392 in the search box to learn more about \"Afshin: Care Instructions. \"  Current as of: June 26, 2019  Content Version: 12.2  © 0945-6093 [employer]. Care instructions adapted under license by [facility] (which disclaims liability or warranty for this information). If you have questions about a medical condition or this instruction, always ask your healthcare professional. [address] any warranty or liability for your use of this information.

## 2019-12-18 NOTE — PROGRESS NOTES
[facility] Note  Subjective:      Xiang Mireles is a 47 y.o. male who presents for an acute visit with the following chief complaints. Chief Complaint   Patient presents with    Cough     pt states he has a persistent cough. [facility] and given fluzone and eye drops. . on Dec 4th. Cough  Patient complains of cough. Symptoms began 2.5 weeks ago. Initially started with pink eye of both eyes, then developed bad cold symptoms 1-2 days later. Symptoms included fever, sore throat and cough. He was evaluated at Urgent care 2 weeks ago, flu testing was negative but he was treated for influenza and given eye drops for conjunctivitis. He reports fever and sore throat resolved after 5 days. His eyes have significantly improved, however he still has mild redness and mild gritty sensation in the morning. He continues to have a dry cough. Aggravated with deep breathing or talking. Cough is made better with rest, seems to be better in the morning. He still has some morning nasal congestion that improves during the day. Patient does not have a history of asthma. Patient does not have a history of environmental allergens. Patient does not have a history of smoking. Treatments: Eye drops as above, he only completed 4-5 days of therapy. OTC cough suppressant is not very helpful. Current Outpatient Medications   Medication Sig Dispense Refill    benzonatate (TESSALON) 100 mg capsule Take 1 Cap by mouth three (3) times daily as needed for Cough for up to 7 days. 21 Cap 0    FOLIC ACID/MULTIVIT,IRON, (CENTRUM PO) Take 1 Tab by mouth daily.  fish oil-omega-3 fatty acids 340-1,000 mg capsule Take 1 Cap by mouth daily.  CHROMIUM PICOLINATE PO Take 1 Tab by mouth daily.  red yeast rice extract 600 mg cap Take 600 mg by mouth now.  mesalamine (LIALDA) 1.2 gram delayed release tablet Take 2.4 g by mouth daily.        Allergies   Allergen Reactions    Other Medication Rash and Itching     Skin rash with itching after application of skin glue on surgical site       ROS:   Complete review of systems was reviewed with pertinent information listed in HPI. Review of Systems   Constitutional: Negative for chills, diaphoresis, fever, malaise/fatigue and weight loss. HENT: Positive for congestion. Negative for ear pain, hearing loss, sinus pain, sore throat and tinnitus. Eyes: Positive for redness. Negative for blurred vision, double vision, photophobia, pain and discharge. Respiratory: Positive for cough. Negative for hemoptysis, sputum production, shortness of breath and wheezing. Cardiovascular: Negative for chest pain and palpitations. Gastrointestinal: Negative for abdominal pain, diarrhea, heartburn, nausea and vomiting. Genitourinary: Negative for dysuria. Musculoskeletal: Negative for myalgias. Skin: Negative for rash. Neurological: Negative for dizziness and headaches. Objective:     Visit Vitals  /89 (BP 1 Location: Right arm, BP Patient Position: Sitting)   Pulse 73   Temp 98.2 °F (36.8 °C) (Oral)   Resp 16   Ht 5' 4\" (1.626 m)   Wt 130 lb (59 kg)   SpO2 99%   BMI 22.31 kg/m²       Vitals and Nurse Documentation reviewed. Physical Exam  Vitals signs and nursing note reviewed. Constitutional:       General: He is not in acute distress. Appearance: He is not ill-appearing, toxic-appearing or diaphoretic. HENT:      Head: Normocephalic and atraumatic. Right Ear: Hearing, tympanic membrane, ear canal and external ear normal. No middle ear effusion. Tympanic membrane is not erythematous or bulging. Left Ear: Hearing, tympanic membrane, ear canal and external ear normal.  No middle ear effusion. Tympanic membrane is not erythematous or bulging. Nose: Congestion present. No mucosal edema or rhinorrhea. Right Sinus: No maxillary sinus tenderness or frontal sinus tenderness.       Left Sinus: No maxillary sinus tenderness or frontal sinus tenderness. Mouth/Throat:      Mouth: Mucous membranes are moist. Mucous membranes are not pale, not dry and not cyanotic. Pharynx: Oropharynx is clear. Uvula midline. No pharyngeal swelling, oropharyngeal exudate or posterior oropharyngeal erythema. Tonsils: No tonsillar abscesses. Eyes:      General: Lids are normal. Vision grossly intact. Right eye: No foreign body or discharge. Left eye: No foreign body or discharge. Conjunctiva/sclera: Conjunctivae normal.      Right eye: No exudate or hemorrhage. Left eye: No exudate or hemorrhage. Pupils: Pupils are equal, round, and reactive to light. Comments: Mild bilateral conjunctival erythema. Neck:      Musculoskeletal: Normal range of motion and neck supple. Trachea: No tracheal deviation. Cardiovascular:      Rate and Rhythm: Normal rate and regular rhythm. Heart sounds: Normal heart sounds, S1 normal and S2 normal. No murmur. No friction rub. No gallop. Pulmonary:      Effort: Pulmonary effort is normal. No respiratory distress. Breath sounds: Normal breath sounds. No decreased breath sounds, wheezing, rhonchi or rales. Chest:      Chest wall: No tenderness. Lymphadenopathy:      Cervical: No cervical adenopathy. Skin:     General: Skin is warm and dry. Findings: No rash. Neurological:      Mental Status: He is alert and oriented to person, place, and time. Gait: Gait is intact. Psychiatric:         Mood and Affect: Mood and affect normal.         Assessment/Plan:     Diagnoses and all orders for this visit:    1. Bronchitis: Persistent dry cough after resolution of additional URI symptoms that initially started 2.5 weeks ago. He appears well today. There is no respiratory distress or adventitious breath sounds on exam. We discussed diagnosis of acute bronchitis.  Discussed likely viral etiology with anticipated length of cough lasting about 3-4 weeks. Continue symptomatic therapy; Rest, increase fluids, cough suppressant, humidification. If symptoms do not improve in 1 week, consider oral steroid dose pack. Advised return office visit if symptoms acutely worsen. -     benzonatate (TESSALON) 100 mg capsule; Take 1 Cap by mouth three (3) times daily as needed for Cough for up to 7 days. 2. Acute conjunctivitis of both eyes, unspecified acute conjunctivitis type: Resolving. No eye pain or vision changes. Favor viral etiology given associate URI symptoms. Discussed hygiene, artificial tears PRN for irritation. RTC if symptoms worsen or do not resolve. Follow-up and Dispositions    · Return if symptoms worsen or fail to improve, for Follow-up.        Discussed expected course/resolution/complications of diagnosis in detail with patient.    Medication risks/benefits/costs/interactions/alternatives discussed with patient.    Pt was given an after visit summary which includes diagnoses, current medications & vitals.  Pt expressed understanding with the diagnosis and plan

## 2020-05-29 ENCOUNTER — VIRTUAL VISIT (OUTPATIENT)
Dept: FAMILY MEDICINE CLINIC | Age: 50
End: 2020-05-29

## 2020-05-29 DIAGNOSIS — L65.9 ALOPECIA: Primary | ICD-10-CM

## 2020-05-29 RX ORDER — FINASTERIDE 1 MG/1
1 TABLET, FILM COATED ORAL DAILY
Qty: 30 TAB | Refills: 2 | Status: SHIPPED | OUTPATIENT
Start: 2020-05-29

## 2020-05-29 RX ORDER — MINOXIDIL 50 MG/G
AEROSOL, FOAM TOPICAL
COMMUNITY

## 2020-05-29 NOTE — PROGRESS NOTES
Chief Complaint   Patient presents with    Hair/Scalp Problem     hair loss    1. Have you been to the ER, urgent care clinic since your last visit? Hospitalized since your last visit? Yes Reason for visit: seen in er for flu in january 2. Have you seen or consulted any other health care providers outside of the [address] [place] [address] since your last visit? Include any pap smears or colon screening.  No

## 2020-05-29 NOTE — PROGRESS NOTES
HISTORY OF PRESENT ILLNESS  HPI  Segun Alejo is a 47 y.o. male with a history of ulcerative colitis who is seen today through virtual visit. Pt complains of hair loss recently. He denies being seen for this issue before. He reports this hair loss has been gradual. Pt says his brother, who is younger, is bald. Pt mentions applying topical minoxidil for about a year. Pt states his thyroid function has been normal when checked in the past. Pt denies taking any high dose supplements, only fish oil, multivitamins, and red rice yeast. Pt inquired about trying finasteride and it's possible side effects. Pt denies taking any medications for depression in the past.  Pt denies unusual SOB, chest pain, and any recent ER visits or hospitalizations. Consent: Segun Alejo, who was seen by synchronous (real-time) audio-video technology, and/or his healthcare decision maker, is aware that this patient-initiated, Telehealth encounter on 5/29/2020 is a billable service, with coverage as determined by his insurance carrier. He is aware that he may receive a bill and has provided verbal consent to proceed: Yes. Segun Alejo is a 47 y.o. male who was evaluated by an audio-video encounter for concerns as above. Patient identification was verified prior to start of the visit. A caregiver was present when appropriate. Due to this being a TeleHealth encounter (During [facility] public health emergency), evaluation of the following organ systems was limited: Vitals/Constitutional/EENT/Resp/CV/GI//MS/Neuro/Skin/Heme-Lymph-Imm. Pursuant to the emergency declaration under the [facility]5 waiver authority and the [employer] Act, this Virtual Visit was conducted, with patient's (and/or legal guardian's) consent, to reduce the patient's risk of exposure to COVID-19 and provide necessary medical care.      Services were provided through a synchronous discussion virtually to substitute for in-person clinic visit. I was in the office. The patient was at home. Past Medical History:   Diagnosis Date    Hyperlipidemia     Lipoma     Sun-damaged skin     [facility]     Past Surgical History:   Procedure Laterality Date    HX COLONOSCOPY      HX TUMOR REMOVAL  2013    lipomas removed from different areas      Current Outpatient Medications on File Prior to Visit   Medication Sig Dispense Refill    Minoxidil 5 % foam by Apply Externally route.  fish oil-omega-3 fatty acids 340-1,000 mg capsule Take 1 Cap by mouth daily.  CHROMIUM PICOLINATE PO Take 1 Tab by mouth daily.  red yeast rice extract 600 mg cap Take 600 mg by mouth now.  mesalamine (LIALDA) 1.2 gram delayed release tablet Take 2.4 g by mouth daily.  FOLIC ACID/MULTIVIT,IRON, (CENTRUM PO) Take 1 Tab by mouth daily. No current facility-administered medications on file prior to visit. Allergies   Allergen Reactions    Other Medication Rash and Itching     Skin rash with itching after application of skin glue on surgical site     Family History   Problem Relation Age of Onset    Hypertension Mother     Thyroid Disease Father         hyper    Thyroid Disease Sister         hyper     Social History     Socioeconomic History    Marital status:      Spouse name: Not on file    Number of children: Not on file    Years of education: Not on file    Highest education level: Not on file   Occupational History    Occupation: [employer]   Tobacco Use    Smoking status: Never Smoker    Smokeless tobacco: Never Used   Substance and Sexual Activity    Alcohol use:  Yes     Alcohol/week: 2.0 standard drinks     Types: 1 Glasses of wine, 1 Shots of liquor per week     Comment: 4 per week    Drug use: No    Sexual activity: Yes     Partners: Female               Review of Systems Constitutional: Negative for chills, diaphoresis, fever, malaise/fatigue and weight loss. Eyes: Negative for blurred vision, double vision, pain and redness. Respiratory: Negative for cough, shortness of breath and wheezing. Cardiovascular: Negative for chest pain, palpitations, orthopnea, claudication, leg swelling and PND. Skin: Negative for itching and rash. Neurological: Negative for dizziness, tingling, tremors, sensory change, speech change, focal weakness, seizures, loss of consciousness, weakness and headaches. Results for orders placed or performed in visit on 02/18/19   CBC WITH AUTOMATED DIFF   Result Value Ref Range    WBC 8.4 3.4 - 10.8 x10E3/uL    RBC 4.60 4.14 - 5.80 x10E6/uL    HGB 13.6 13.0 - 17.7 g/dL    HCT 41.2 37.5 - 51.0 %    MCV 90 79 - 97 fL    MCH 29.6 26.6 - 33.0 pg    MCHC 33.0 31.5 - 35.7 g/dL    RDW 13.7 12.3 - 15.4 %    PLATELET 246 199 - 640 x10E3/uL    NEUTROPHILS 49 Not Estab. %    Lymphocytes 43 Not Estab. %    MONOCYTES 6 Not Estab. %    EOSINOPHILS 2 Not Estab. %    BASOPHILS 0 Not Estab. %    ABS. NEUTROPHILS 4.1 1.4 - 7.0 x10E3/uL    Abs Lymphocytes 3.6 (H) 0.7 - 3.1 x10E3/uL    ABS. MONOCYTES 0.5 0.1 - 0.9 x10E3/uL    ABS. EOSINOPHILS 0.2 0.0 - 0.4 x10E3/uL    ABS. BASOPHILS 0.0 0.0 - 0.2 x10E3/uL    IMMATURE GRANULOCYTES 0 Not Estab. %    ABS. IMM.  GRANS. 0.0 0.0 - 0.1 x10E3/uL   LIPID PANEL   Result Value Ref Range    Cholesterol, total 195 100 - 199 mg/dL    Triglyceride 90 0 - 149 mg/dL    HDL Cholesterol 40 >39 mg/dL    VLDL, calculated 18 5 - 40 mg/dL    LDL, calculated 137 (H) 0 - 99 mg/dL   TSH REFLEX TO T4   Result Value Ref Range    TSH 1.300 0.450 - 1.248 uIU/mL   METABOLIC PANEL, COMPREHENSIVE   Result Value Ref Range    Glucose 79 65 - 99 mg/dL    BUN 15 6 - 24 mg/dL    Creatinine 1.28 (H) 0.76 - 1.27 mg/dL    GFR est non-AA 65 >59 mL/min/1.73    GFR est AA 75 >59 mL/min/1.73    BUN/Creatinine ratio 12 9 - 20    Sodium 136 134 - 144 mmol/L Potassium 4.3 3.5 - 5.2 mmol/L    Chloride 99 96 - 106 mmol/L    CO2 24 20 - 29 mmol/L    Calcium 9.2 8.7 - 10.2 mg/dL    Protein, total 7.7 6.0 - 8.5 g/dL    Albumin 4.2 3.5 - 5.5 g/dL    GLOBULIN, TOTAL 3.5 1.5 - 4.5 g/dL    A-G Ratio 1.2 1.2 - 2.2    Bilirubin, total 0.3 0.0 - 1.2 mg/dL    Alk. phosphatase 82 39 - 117 IU/L    AST (SGOT) 19 0 - 40 IU/L    ALT (SGPT) 15 0 - 44 IU/L   UA/M W/RFLX CULTURE, ROUTINE   Result Value Ref Range    Specific Gravity 1.015 1.005 - 1.030    pH (UA) 5.5 5.0 - 7.5    Color Yellow Yellow    Appearance Clear Clear    Leukocyte Esterase Negative Negative    Protein Negative Negative/Trace    Glucose Negative Negative    Ketone Negative Negative    Blood Negative Negative    Bilirubin Negative Negative    Urobilinogen 0.2 0.2 - 1.0 mg/dL    Nitrites Negative Negative    Microscopic Examination Comment     Microscopic exam See additional order     URINALYSIS REFLEX Comment    MICROALBUMIN, UR, RAND W/ MICROALB/CREAT RATIO   Result Value Ref Range    Creatinine, urine 90.4 Not Estab. mg/dL    Microalbumin, urine <3.0 Not Estab. ug/mL    Microalb/Creat ratio (ug/mg creat.) <3.3 0.0 - 30.0 mg/g creat   HEMOGLOBIN A1C WITH EAG   Result Value Ref Range    Hemoglobin A1c 5.8 (H) 4.8 - 5.6 %    Estimated average glucose 120 mg/dL   C REACTIVE PROTEIN, QT   Result Value Ref Range    C-Reactive Protein, Qt 24.0 (H) 0.0 - 4.9 mg/L   MICROSCOPIC EXAMINATION   Result Value Ref Range    WBC 0-5 0 - 5 /hpf    RBC 0-2 0 - 2 /hpf    Epithelial cells None seen 0 - 10 /hpf    Casts None seen None seen /lpf    Mucus Present Not Estab. Bacteria None seen None seen/Few   CVD REPORT   Result Value Ref Range    INTERPRETATION Note          Physical Exam  There were no vitals taken for this visit.       General: alert, cooperative, no distress   Mental  status: normal mood, behavior, speech, dress, motor activity, and thought processes, able to follow commands   HENT: NCAT   Neck: no visualized mass Resp: no respiratory distress   Neuro: no gross deficits   Skin: no discoloration or lesions of concern on visible areas   Psychiatric: normal affect, consistent with stated mood, no evidence of hallucinations       ASSESSMENT and PLAN    ICD-10-CM ICD-9-CM    1. Alopecia L65.9 704.00 Minoxidil 5 % foam      TSH 3RD GENERATION      finasteride (PROPECIA) 1 mg tablet    there is a famly hx in [facility] and a brother     Diagnoses and all orders for this visit:    1. Alopecia  Comments:  there is a famly hx in [facility] and a brother  Orders:  -     TSH 3RD GENERATION  -     finasteride (PROPECIA) 1 mg tablet; Take 1 Tab by mouth daily. Follow-up and Dispositions    · Return in about 3 months (around 8/29/2020) for F/U start of Propecia. reviewed medications and side effects in detail  Please call my office if there are any questions- [phone]. Discussed expected course/resolution/complications of diagnosis in detail with patient. Medication risks/benefits/costs/interactions/alternatives discussed with patient. Pt was given an after visit summary which includes diagnoses, current medications & vitals. Pt expressed understanding with the diagnosis and plan. Patient to call if no better in 3 -4 days and prn new problems. Total 25 minutes,60 % counseling re: Recommended a weekly \"heart check. \" I went into detail how to do this. Regular exercise is very important to your health; it helps mentally, physically, socially; it prevents injuries if done properly. Exercise, even as simple as walking 20-30 minutes daily has major benefits to your health even though your \"numbers\" are the same in the lab. See if you can add this into your daily regimen and after a few months it will become a regular habit-\"just something you do,\" like brushing your teeth.      A combination of aerobic exercise and strengthening and stretching is felt to be the best for you, so this should be your ultimate goal.   This can be done in the privacy of your home or in a group setting as at the gym  Some prefer having a , others prefer to do exercise in groups or individually. Do what \"works\" for you. You need to make it simple and \"fun,\" or you most likely will not continue it. Also, discussed symptoms of concern that were noted today in the note above, treatment options( including doing nothing), when to follow up before recommended time frame. Also, answered all questions. We will check pt's thyroid function one more time, since it was over a year ago when checked most recently. Encouraged him to look at his vitamins to make sure he is not getting extra vitamin A, and if he is to reduce that. Assuming everything is normal as far as thyroid and vitamin A, he can add finasteride 1 mg daily to his regimen. Talked to him about cost of the medication and said it is possible for him to obtain the 5 mg dose and cut it in quarters. This document was written by Edilberto Jolie, as dictated by Eliana Cutler MD.  I have reviewed and agree with the above note and have made corrections where appropriate Lion Montemayor M.D.

## 2020-06-02 LAB — TSH SERPL DL<=0.005 MIU/L-ACNC: 2.82 UIU/ML (ref 0.45–4.5)

## 2021-05-31 DIAGNOSIS — L65.9 ALOPECIA: ICD-10-CM

## 2021-06-01 RX ORDER — FINASTERIDE 1 MG/1
1 TABLET, FILM COATED ORAL DAILY
Qty: 30 TABLET | Refills: 2 | OUTPATIENT
Start: 2021-06-01

## 2021-10-26 NOTE — MR AVS SNAPSHOT
Visit Information Date & Time Provider Department Dept. Phone Encounter #  
 10/20/2017 10:15 AM Lisa Hahn MD [place] [id number] [facility] [phone] [id number] Follow-up Instructions Return in about 1 year (around 10/20/2018) for CPE, 30 min slot. Upcoming Health Maintenance Date Due DTaP/Tdap/Td series (1 - Tdap) 9/13/1990 INFLUENZA AGE 9 TO ADULT 8/1/2017 Allergies as of 10/20/2017  Review Complete On: 10/20/2017 By: Lisa Hahn MD  
 No Known Allergies Current Immunizations  Never Reviewed Name Date Influenza Vaccine (Quad) PF 10/20/2017 Not reviewed this visit You Were Diagnosed With   
  
 Codes Comments Routine general medical examination at a health care facility    -  Primary ICD-10-CM: Z00.00 ICD-9-CM: V70.0 Encounter for immunization     ICD-10-CM: J27 ICD-9-CM: V03.89 Ulcerative colitis with complication, unspecified location [facility])     ICD-10-CM: I10.224 ICD-9-CM: 556.9 Lipoma, unspecified site     ICD-10-CM: D17.9 ICD-9-CM: 214.9 Vitals BP Pulse Temp Resp Height(growth percentile) Weight(growth percentile) 120/76 (BP 1 Location: Left arm, BP Patient Position: Sitting) 78 97.4 °F (36.3 °C) (Oral) 18 5' 4.4\" (1.636 m) 132 lb 6.4 oz (60.1 kg) SpO2 BMI Smoking Status 98% 22.44 kg/m2 Never Smoker Vitals History BMI and BSA Data Body Mass Index Body Surface Area  
 22.44 kg/m 2 1.65 m 2 Preferred Pharmacy Pharmacy Name Phone [facility] [place] [address] [facility], [place] [phone] Your Updated Medication List  
  
   
This list is accurate as of: 10/20/17 10:57 AM.  Always use your most recent med list.  
  
  
  
  
 REMICADE 100 mg injection Generic drug:  inFLIXimab  
5 mg/kg by IntraVENous route once. VITAMIN D3 1,000 unit Cap Generic drug:  cholecalciferol Take  by mouth daily. We Performed the Following C REACTIVE PROTEIN, QT [42277 CPT(R)] CBC WITH AUTOMATED DIFF [88543 CPT(R)] HEMOGLOBIN A1C WITH EAG [68679 CPT(R)] INFLUENZA VIRUS VAC QUAD,SPLIT,PRESV FREE SYRINGE IM [id number] CPT(R)] LIPID PANEL [38804 CPT(R)] METABOLIC PANEL, COMPREHENSIVE [15686 CPT(R)] MICROALBUMIN, UR, RAND W/ MICROALBUMIN/CREA RATIO [id number] CPT(R)] [place] IMMUNIZ ADMIN,1 SINGLE/COMB VAC/TOXOID [id number] CPT(R)] PSA SCREENING (SCREENING) [ [facility]] PSA SCREENING (SCREENING) [ [facility]] QUANTIFERON TB GOLD [[id number] Custom] REFERRAL TO DERMATOLOGY [REF19 Custom] TSH REFLEX TO T4 [[id number] Custom] UA/M W/RFLX CULTURE, ROUTINE [[id number] Custom] VITAMIN D, 25 HYDROXY [id number] CPT(R)] Follow-up Instructions Return in about 1 year (around 10/20/2018) for CPE, 30 min slot. Referral Information Referral ID Referred By Referred To  
  
 [id number] [facility], [address] [place] [address] [address] Phone: [phone] Fax: [phone] Visits Status Start Date End Date 1 New Request 10/20/17 10/20/18 If your referral has a status of pending review or denied, additional information will be sent to support the outcome of this decision. Patient Instructions Vaccine Information Statement Influenza (Flu) Vaccine (Inactivated or Recombinant): What you need to know Many Vaccine Information Statements are available in Irish and other languages. See www.immunize.org/vis Hojas de Información Sobre Vacunas están disponibles en Español y en muchos otros idiomas. Visite www.immunize.org/vis 1. Why get vaccinated? Influenza (flu) is a contagious disease that spreads around the [place] every year, usually between October and May. Flu is caused by influenza viruses, and is spread mainly by coughing, sneezing, and close contact. Anyone can get flu. Flu strikes suddenly and can last several days. Symptoms vary by age, but can include: 
 fever/chills  sore throat  muscle aches  fatigue  cough  headache  runny or stuffy nose Flu can also lead to pneumonia and blood infections, and cause diarrhea and seizures in children. If you have a medical condition, such as heart or lung disease, flu can make it worse. Flu is more dangerous for some people. Infants and young children, people 72years of age and older, pregnant women, and people with certain health conditions or a weakened immune system are at greatest risk. Each year thousands of people in the [facility] die from flu, and many more are hospitalized. Flu vaccine can: 
 keep you from getting flu, 
 make flu less severe if you do get it, and 
 keep you from spreading flu to your family and other people. 2. Inactivated and recombinant flu vaccines A dose of flu vaccine is recommended every flu season. Children 6 months through 6years of age may need two doses during the same flu season. Everyone else needs only one dose each flu season. Some inactivated flu vaccines contain a very small amount of a mercury-based preservative called thimerosal. Studies have not shown thimerosal in vaccines to be harmful, but flu vaccines that do not contain thimerosal are available. There is no live flu virus in flu shots. They cannot cause the flu. There are many flu viruses, and they are always changing. Each year a new flu vaccine is made to protect against three or four viruses that are likely to cause disease in the upcoming flu season. But even when the vaccine doesnt exactly match these viruses, it may still provide some protection Flu vaccine cannot prevent: 
 flu that is caused by a virus not covered by the vaccine, or 
 illnesses that look like flu but are not.  
 
It takes about 2 weeks for protection to develop after vaccination, and protection lasts through the flu season. 3. Some people should not get this vaccine Tell the person who is giving you the vaccine:  If you have any severe, life-threatening allergies. If you ever had a life-threatening allergic reaction after a dose of flu vaccine, or have a severe allergy to any part of this vaccine, you may be advised not to get vaccinated. Most, but not all, types of flu vaccine contain a small amount of egg protein.  If you ever had Guillain-Barré Syndrome (also called GBS). Some people with a history of GBS should not get this vaccine. This should be discussed with your doctor.  If you are not feeling well. It is usually okay to get flu vaccine when you have a mild illness, but you might be asked to come back when you feel better. 4. Risks of a vaccine reaction With any medicine, including vaccines, there is a chance of reactions. These are usually mild and go away on their own, but serious reactions are also possible. Most people who get a flu shot do not have any problems with it. Minor problems following a flu shot include:  
 soreness, redness, or swelling where the shot was given  hoarseness  sore, red or itchy eyes  cough  fever  aches  headache  itching  fatigue If these problems occur, they usually begin soon after the shot and last 1 or 2 days. More serious problems following a flu shot can include the following:  There may be a small increased risk of Guillain-Barré Syndrome (GBS) after inactivated flu vaccine. This risk has been estimated at 1 or 2 additional cases per million people vaccinated. This is much lower than the risk of severe complications from flu, which can be prevented by flu vaccine.    
 
 Young children who get the flu shot along with pneumococcal vaccine (PCV13) and/or DTaP vaccine at the same time might be slightly more likely to have a seizure caused by fever. Ask your doctor for more information. Tell your doctor if a child who is getting flu vaccine has ever had a seizure. Problems that could happen after any injected vaccine:  People sometimes faint after a medical procedure, including vaccination. Sitting or lying down for about 15 minutes can help prevent fainting, and injuries caused by a fall. Tell your doctor if you feel dizzy, or have vision changes or ringing in the ears.  Some people get severe pain in the shoulder and have difficulty moving the arm where a shot was given. This happens very rarely.  Any medication can cause a severe allergic reaction. Such reactions from a vaccine are very rare, estimated at about 1 in a million doses, and would happen within a few minutes to a few hours after the vaccination. As with any medicine, there is a very remote chance of a vaccine causing a serious injury or death. The safety of vaccines is always being monitored. For more information, visit: www.cdc.gov/vaccinesafety/ 
 
 
The [facility] Vaccine Injury Compensation Program (VICP) is a federal program that was created to compensate people who may have been injured by certain vaccines. Persons who believe they may have been injured by a vaccine can learn about the program and about filing a claim by calling 7-145.656.1979 or visiting the [address] website at www.[place].gov/vaccinecompensation. There is a time limit to file a claim for compensation. 7. How can I learn more?  Ask your healthcare provider. He or she can give you the vaccine package insert or suggest other sources of information.  Call your local or state health department.  Contact the Centers for Disease Control and Prevention (CDC): 
- Call 0-351.625.2367 (1-800-CDC-INFO) or 
- Visit CDCs website at www.cdc.gov/flu Vaccine Information Statement Inactivated Influenza Vaccine 8/7/2015 
[address] Karen Oppenheim 120IU-21 Department of Health and [employer] Centers for Disease Control and Prevention Office Use Only Golfer's Elbow: Exercises Your Care Instructions Here are some examples of typical rehabilitation exercises for your condition. Start each exercise slowly. Ease off the exercise if you start to have pain. Your doctor or physical therapist will tell you when you can start these exercises and which ones will work best for you. How to do the exercises Wrist extensor stretch 1. Extend your affected arm in front of you and make a fist with your palm facing down. 2. Bend your wrist so that your fist points toward the floor. 3. With your other hand, gently bend your wrist farther until you feel a mild to moderate stretch in your forearm. 4. Hold for at least 15 to 30 seconds. 5. Repeat 2 to 4 times. 6. Repeat steps 1 through 5 with your fingers pointing toward the floor. Forearm extensor stretch 1. Place your affected elbow down at your side, bent at about 90 degrees. Then make a fist with your palm facing down. 2. Keeping your wrist bent, slowly straighten your elbow so your arm is down at your side. Then twist your fist out so your palm is facing out to the side and you feel a stretch. 3. Hold for at least 15 to 30 seconds. 4. Repeat 2 to 4 times. Wrist flexor stretch 1. Extend your affected arm in front of you with your palm facing away from your body. 2. Bend back your wrist, pointing your hand up toward the ceiling. 3. With your other hand, gently bend your wrist farther until you feel a mild to moderate stretch in your forearm. 4. Hold for at least 15 to 30 seconds. 5. Repeat 2 to 4 times. 6. Repeat steps 1 through 5, but this time extend your affected arm in front of you with your palm facing up. Then bend back your wrist, pointing your hand toward the floor. Wrist curls 1. Place your forearm on a table with your hand hanging over the edge of the table, palm up. 2. Place a 1- to 2-pound weight in your hand. This may be a dumbbell, a can of food, or a filled water bottle. 3. Slowly raise and lower the weight while keeping your forearm on the table and your palm facing up. 4. Repeat this motion 8 to 12 times. 5. Switch arms, and do steps 1 through 4. 
6. Repeat with your hand facing down toward the floor. Switch arms. Resisted wrist extension 1. Sit leaning forward with your legs slightly spread. Then place your affected forearm on your thigh with your hand and wrist in front of your knee. 2. Grasp one end of an exercise band with your palm down, and step on the other end. 
3. Slowly bend your wrist upward for a count of 2, then lower your wrist slowly to a count of 5. 
4. Repeat 8 to 12 times. Resisted wrist flexion 1. Sit leaning forward with your legs slightly spread. Then place your affected forearm on your thigh with your hand and wrist in front of your knee.  
2. Grasp one end of an exercise band with your palm up, and step on the other end. 
3. Slowly bend your wrist upward for a count of 2, then lower your wrist slowly to a count of 5. 
4. Repeat 8 to 12 times. Neck stretch to the side 1. This stretch works best if you keep your shoulder down as you lean away from it. To help you remember to do this, start by relaxing your shoulders and lightly holding on to your thighs or your chair. 2. Tilt your head away from your affected elbow and toward your opposite shoulder. For example, if your right elbow is sore, keep your right shoulder down as you lean your head toward your left shoulder. 3. Hold for 15 to 30 seconds. Let the weight of your head stretch your muscles. 4. If you would like a little added stretch, use your hand to gently and steadily pull your head toward your shoulder. For example, if your right elbow is sore, use your left hand to gently pull your head toward your left shoulder. 5. Repeat 2 to 4 times. Resisted forearm pronation 1. Sit leaning forward with your legs slightly spread. Then place your affected forearm on your thigh with your hand and wrist in front of your knee. 2. Grasp one end of an exercise band with your palm up, and step on the other end. 3. Keeping your wrist straight, roll your palm inward toward your thigh for a count of 2, then slowly move your wrist back to the starting position to a count of 5. 
4. Repeat 8 to 12 times. Resisted supination 1. Sit leaning forward with your legs slightly spread. Then place your affected forearm on your thigh with your hand and wrist in front of your knee. 2. Grasp one end of an exercise band with your palm down, and step on the other end. 3. Keeping your wrist straight, roll your palm outward and away from your thigh for a count of 2, then slowly move your wrist back to the starting position to a count of 5. 
4. Repeat 8 to 12 times. Follow-up care is a key part of your treatment and safety.  Be sure to make and go to all appointments, and call your doctor if you are having problems. It's also a good idea to know your test results and keep a list of the medicines you take. Where can you learn more? Go to http://bibi-toño.info/. Enter [phone] in the search box to learn more about \"Golfer's Elbow: Exercises. \" Current as of: March 21, 2017 Content Version: 11.3 © 1968-2447 [employer]. Care instructions adapted under license by [employer] (which disclaims liability or warranty for this information). If you have questions about a medical condition or this instruction, always ask your healthcare professional. [address] any warranty or liability for your use of this information. Introducing [facility]! [employer] introduces Greats patient portal. Now you can access parts of your medical record, email your doctor's office, and request medication refills online. 1. In your internet browser, go to [email or web address] 2. Click on the First Time User? Click Here link in the Sign In box. You will see the New Member Sign Up page. 3. Enter your Greats Access Code exactly as it appears below. You will not need to use this code after youve completed the sign-up process. If you do not sign up before the expiration date, you must request a new code. · Greats Access Code: [email or web address] Expires: 1/18/2018 10:57 AM 
 
4. Enter the last four digits of your Social Security Number (xxxx) and Date of Birth (mm/dd/yyyy) as indicated and click Submit. You will be taken to the next sign-up page. 5. Create a Greats ID. This will be your Greats login ID and cannot be changed, so think of one that is secure and easy to remember. 6. Create a Greats password. You can change your password at any time. 7. Enter your Password Reset Question and Answer. This can be used at a later time if you forget your password. 8. Enter your e-mail address. You will receive e-mail notification when new information is available in [address] 9. Click Sign Up. You can now view and download portions of your medical record. 10. Click the Download Summary menu link to download a portable copy of your medical information. If you have questions, please visit the Frequently Asked Questions section of the [employer] website. Remember, [employer] is NOT to be used for urgent needs. For medical emergencies, dial 911. Now available from your iPhone and Android! Please provide this summary of care documentation to your next provider. Your primary care clinician is listed as Tramaine Alba. If you have any questions after today's visit, please call [phone]. Statement Selected

## 2022-03-18 PROBLEM — K51.90 ULCERATIVE COLITIS WITHOUT COMPLICATIONS (HCC): Status: ACTIVE | Noted: 2019-02-19

## 2023-05-23 RX ORDER — CRANBERRY FRUIT EXTRACT 200 MG
600 CAPSULE ORAL
COMMUNITY

## 2023-05-23 RX ORDER — FINASTERIDE 1 MG/1
1 TABLET, FILM COATED ORAL DAILY
COMMUNITY
Start: 2020-05-29

## 2023-05-23 RX ORDER — MESALAMINE 1.2 G/1
2.4 TABLET, DELAYED RELEASE ORAL DAILY
COMMUNITY
Start: 2018-01-16